# Patient Record
Sex: MALE | Race: WHITE | Employment: PART TIME | URBAN - NONMETROPOLITAN AREA
[De-identification: names, ages, dates, MRNs, and addresses within clinical notes are randomized per-mention and may not be internally consistent; named-entity substitution may affect disease eponyms.]

---
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
## 2019-05-31 ENCOUNTER — OFFICE VISIT (OUTPATIENT)
Dept: FAMILY MEDICINE CLINIC | Age: 19
End: 2019-05-31
Payer: COMMERCIAL

## 2019-05-31 VITALS
OXYGEN SATURATION: 100 % | SYSTOLIC BLOOD PRESSURE: 118 MMHG | BODY MASS INDEX: 26.32 KG/M2 | HEIGHT: 78 IN | TEMPERATURE: 97.8 F | WEIGHT: 227.5 LBS | DIASTOLIC BLOOD PRESSURE: 70 MMHG | HEART RATE: 56 BPM

## 2019-05-31 DIAGNOSIS — M79.644 PAIN IN FINGER OF RIGHT HAND: Primary | ICD-10-CM

## 2019-05-31 DIAGNOSIS — S60.011A CONTUSION OF RIGHT THUMB WITHOUT DAMAGE TO NAIL, INITIAL ENCOUNTER: ICD-10-CM

## 2019-05-31 PROCEDURE — 99213 OFFICE O/P EST LOW 20 MIN: CPT | Performed by: FAMILY MEDICINE

## 2019-05-31 RX ORDER — MINOCYCLINE HYDROCHLORIDE 100 MG/1
CAPSULE ORAL
Refills: 0 | COMMUNITY
Start: 2019-04-27 | End: 2019-06-04

## 2019-06-04 ENCOUNTER — OFFICE VISIT (OUTPATIENT)
Dept: FAMILY MEDICINE CLINIC | Age: 19
End: 2019-06-04
Payer: COMMERCIAL

## 2019-06-04 VITALS
HEIGHT: 78 IN | BODY MASS INDEX: 26.27 KG/M2 | SYSTOLIC BLOOD PRESSURE: 118 MMHG | TEMPERATURE: 97.3 F | HEART RATE: 72 BPM | WEIGHT: 227 LBS | OXYGEN SATURATION: 98 % | DIASTOLIC BLOOD PRESSURE: 72 MMHG

## 2019-06-04 DIAGNOSIS — J01.10 ACUTE FRONTAL SINUSITIS, RECURRENCE NOT SPECIFIED: Primary | ICD-10-CM

## 2019-06-04 PROBLEM — G57.31: Status: ACTIVE | Noted: 2018-08-31

## 2019-06-04 PROCEDURE — 99213 OFFICE O/P EST LOW 20 MIN: CPT | Performed by: NURSE PRACTITIONER

## 2019-06-04 RX ORDER — FLUTICASONE PROPIONATE 50 MCG
2 SPRAY, SUSPENSION (ML) NASAL DAILY
Qty: 1 BOTTLE | Refills: 0 | Status: SHIPPED
Start: 2019-06-04 | End: 2020-06-08

## 2019-06-04 RX ORDER — MONTELUKAST SODIUM 10 MG/1
10 TABLET ORAL NIGHTLY
COMMUNITY
End: 2020-06-08

## 2019-06-04 RX ORDER — AMOXICILLIN AND CLAVULANATE POTASSIUM 875; 125 MG/1; MG/1
1 TABLET, FILM COATED ORAL 2 TIMES DAILY
Qty: 20 TABLET | Refills: 0 | Status: SHIPPED | OUTPATIENT
Start: 2019-06-04 | End: 2019-06-14

## 2019-06-04 NOTE — PROGRESS NOTES
Subjective:  Chief Complaint   Patient presents with    Cough     for 3 weeks    Congestion    Sinus Problem       HPI: The patient states that they have had sinus pressure and congestion for the last 21 days. The patient does admit to facial pressure. Admits to cough which is productive of sputum. The patient also reports nasal congestion and sore throat. Denies pain with swallowing/difficulty swallowing. Patient has had purulent and clear rhinorrhea. Mild headache. Denies fevers chills. No dizziness, visual disturbances and or neck stiffness. No chest pain or dyspnea. No vomiting or diarrhea. The patient presents for evaluation. ROS:  Const: Positives and pertinent negatives as per HPI. All others reviewed and are negative. Current Outpatient Medications:     montelukast (SINGULAIR) 10 MG tablet, Take 10 mg by mouth nightly, Disp: , Rfl:     amoxicillin-clavulanate (AUGMENTIN) 875-125 MG per tablet, Take 1 tablet by mouth 2 times daily for 10 days, Disp: 20 tablet, Rfl: 0    fluticasone (FLONASE) 50 MCG/ACT nasal spray, 2 sprays by Each Nostril route daily, Disp: 1 Bottle, Rfl: 0  No Known Allergies    Objective:  Vitals:    06/04/19 1157   BP: 118/72   Site: Left Upper Arm   Position: Sitting   Cuff Size: Medium Adult   Pulse: 72   Temp: 97.3 °F (36.3 °C)   TempSrc: Temporal   SpO2: 98%   Weight: 227 lb (103 kg)   Height: (!) 6' 6\" (1.981 m)       Exam:  Exam:  Const: Appears healthy and well developed. No signs of acute distress present. Vitals reviewed per triage. Head/Face: Normocephalic, atraumatic. Facies is symmetric. Tenderness is noted over the frontal sinuses. Eyes: PERRL. ENMT: Tympanic membranes are pearly gray with good light reflex bilaterally. Nares have purulent rhinorhea Buccal mucosa is moist. Mild erythema in the posterior pharynx. Purulent PND is noted. Neck: Supple and symmetric. Palpation reveals no adenopathy. No meningeal signs. Trachea midline.    Resp: Lungs are clear bilaterally in all lung fields. Chest expansion is symmetrical no accessory muscle use. CV: S1 is normal. S2 is normal . No murmurs rubs or cicksExtremities: Peripheral circulation is grossly normal.  Musculo: Patient moves extremities without pain or limitation. Skin: Skin is warm and dry. Neuro: Alert and oriented x3. Speech is articulate and fluent. Psych: Mood/Affect: Patient's mood and affect is appropriate to situation. Discussed the use with over-the-counter probiotics for the patient to help decrease adverse effected related to the antibiotics. Reviewed s/s that would warrant further eval.    Assessment and Plan:  Preston Hoffmann was seen today for cough, congestion and sinus problem. Diagnoses and all orders for this visit:    Acute frontal sinusitis, recurrence not specified  -     amoxicillin-clavulanate (AUGMENTIN) 875-125 MG per tablet;  Take 1 tablet by mouth 2 times daily for 10 days  -     fluticasone (FLONASE) 50 MCG/ACT nasal spray; 2 sprays by Each Nostril route daily        Lenka Moreland, DI - CNP

## 2019-12-13 RX ORDER — BUPROPION HYDROCHLORIDE 150 MG/1
150 TABLET ORAL EVERY MORNING
COMMUNITY
End: 2019-12-13 | Stop reason: CLARIF

## 2019-12-13 RX ORDER — BUPROPION HYDROCHLORIDE 300 MG/1
300 TABLET ORAL EVERY MORNING
COMMUNITY
End: 2020-03-05 | Stop reason: ALTCHOICE

## 2020-01-29 ENCOUNTER — OFFICE VISIT (OUTPATIENT)
Dept: FAMILY MEDICINE CLINIC | Age: 20
End: 2020-01-29
Payer: COMMERCIAL

## 2020-01-29 VITALS
BODY MASS INDEX: 25.03 KG/M2 | HEART RATE: 98 BPM | SYSTOLIC BLOOD PRESSURE: 130 MMHG | TEMPERATURE: 97.9 F | WEIGHT: 212 LBS | RESPIRATION RATE: 18 BRPM | HEIGHT: 77 IN | OXYGEN SATURATION: 96 % | DIASTOLIC BLOOD PRESSURE: 76 MMHG

## 2020-01-29 LAB
INFLUENZA A ANTIBODY: NORMAL
INFLUENZA B ANTIBODY: NORMAL

## 2020-01-29 PROCEDURE — 99213 OFFICE O/P EST LOW 20 MIN: CPT | Performed by: PHYSICIAN ASSISTANT

## 2020-01-29 PROCEDURE — 94640 AIRWAY INHALATION TREATMENT: CPT | Performed by: PHYSICIAN ASSISTANT

## 2020-01-29 PROCEDURE — 87804 INFLUENZA ASSAY W/OPTIC: CPT | Performed by: PHYSICIAN ASSISTANT

## 2020-01-29 RX ORDER — IPRATROPIUM BROMIDE AND ALBUTEROL SULFATE 2.5; .5 MG/3ML; MG/3ML
1 SOLUTION RESPIRATORY (INHALATION) ONCE
Status: COMPLETED | OUTPATIENT
Start: 2020-01-29 | End: 2020-01-29

## 2020-01-29 RX ORDER — PREDNISONE 10 MG/1
TABLET ORAL
Qty: 20 TABLET | Refills: 0 | Status: SHIPPED | OUTPATIENT
Start: 2020-01-29 | End: 2020-02-06

## 2020-01-29 RX ORDER — AZITHROMYCIN 250 MG/1
250 TABLET, FILM COATED ORAL SEE ADMIN INSTRUCTIONS
Qty: 6 TABLET | Refills: 0 | Status: SHIPPED | OUTPATIENT
Start: 2020-01-29 | End: 2020-02-03

## 2020-01-29 RX ORDER — ALBUTEROL SULFATE 90 UG/1
2 AEROSOL, METERED RESPIRATORY (INHALATION) 4 TIMES DAILY PRN
Qty: 1 INHALER | Refills: 0 | Status: SHIPPED
Start: 2020-01-29 | End: 2020-03-06 | Stop reason: ALTCHOICE

## 2020-01-29 RX ADMIN — IPRATROPIUM BROMIDE AND ALBUTEROL SULFATE 1 AMPULE: 2.5; .5 SOLUTION RESPIRATORY (INHALATION) at 13:18

## 2020-01-29 NOTE — PROGRESS NOTES
2020   [address]  [facility] [address]  [address] [facility] [address]  : 2000  Age: 20 y.o. Sex: male      Subjective:  Chief Complaint   Patient presents with    Cough    Congestion       HPI: The patient states cough and congestion that started over a month ago but got much worse since Monday. Cough is productive of discolored sputum. Pt has not been taking OTC medications. Denies fever, chills. Denies chest pain or shortness of breath. No vomiting or diarrhea. Eating and drinking without difficulty. Non smoker. The patient presents for evaluation. ROS:    Constitutional: Negative for fatigue, fever and unexpected weight change. HENT: + for congestion. Negative for  ear discharge, ear pain, hearing loss, mouth sores, sneezing and sore throat. Eyes: Negative for photophobia, pain, redness and itching. Respiratory: +cough,  Negative for chest tightness, shortness of breath and wheezing. Cardiovascular: Negative for chest pain, palpitations and leg swelling. Gastrointestinal:  Negative for abdominal pain, constipation, diarrhea, nausea and vomiting. Endocrine: Negative for cold intolerance and heat intolerance. Genitourinary: Negative for difficulty urinating, dysuria, frequency, hematuria and urgency. Musculoskeletal: Negative for arthralgias, back pain, joint swelling, myalgias, neck pain and neck stiffness. Skin: Negative for color change, pallor and wound. Allergic/Immunologic: Negative for environmental allergies and food allergies. Neurological: Negative for dizziness, seizures, syncope, weakness, light-headedness and headaches. Hematological: Negative for adenopathy. Current Outpatient Medications:     predniSONE (DELTASONE) 10 MG tablet, Take 4 tablets by mouth daily for 2 days, THEN 3 tablets daily for 2 days, THEN 2 tablets daily for 2 days, THEN 1 tablet daily for 2 days. , Disp: 20 tablet, Rfl: 0    albuterol sulfate HFA 108 (90 Base) MCG/ACT inhaler, Inhale 2 puffs into the lungs 4 times daily as needed for Wheezing, Disp: 1 Inhaler, Rfl: 0    azithromycin (ZITHROMAX) 250 MG tablet, Take 1 tablet by mouth See Admin Instructions for 5 days 500mg on day 1 followed by 250mg on days 2 - 5, Disp: 6 tablet, Rfl: 0    buPROPion (WELLBUTRIN XL) 300 MG extended release tablet, Take 300 mg by mouth every morning, Disp: , Rfl:     montelukast (SINGULAIR) 10 MG tablet, Take 10 mg by mouth nightly, Disp: , Rfl:     fluticasone (FLONASE) 50 MCG/ACT nasal spray, 2 sprays by Each Nostril route daily, Disp: 1 Bottle, Rfl: 0   No Known Allergies     Objective:  Vitals:    01/29/20 1307   BP: 130/76   Pulse: 98   Resp: 18   Temp: 97.9 °F (36.6 °C)   TempSrc: Temporal   SpO2: 96%   Weight: 212 lb (96.2 kg)   Height: 6' 5\" (1.956 m)        Exam:  Const: Appears healthy and well developed. No signs of acute distress present. Vitals reviewed per triage. Head/Face: Normocephalic, atraumatic. Facies is symmetric. Eyes: PERRL. ENMT: Tympanic membranes are pearly gray with good light reflex bilaterally. Nares are patent. Buccal mucosa is moist.  No erythema in the posterior pharynx. Neck: Supple and symmetric. Palpation reveals no adenopathy. Trachea midline. Resp: Lungs with decreased air exchange and scattered wheezes throughout all lung field. No respiratory distress noted. CV: S1 is normal. S2 is normal.Pulses are equal bilaterally. Musculo: Patient moves extremities without pain or limitation. No pedal edema. Skin: Skin is warm and dry. Neuro: Alert and oriented x3. Speech is articulate and fluent. Psych: Patient's mood and affect is appropriate to situation. Ezio was seen today for cough and congestion. Diagnoses and all orders for this visit:    Cough  -     ipratropium-albuterol (DUONEB) nebulizer solution 1 ampule  -     POCT Influenza A/B    Bronchitis  -     predniSONE (DELTASONE) 10 MG tablet;  Take 4 tablets by

## 2020-03-05 ENCOUNTER — OFFICE VISIT (OUTPATIENT)
Dept: FAMILY MEDICINE CLINIC | Age: 20
End: 2020-03-05
Payer: COMMERCIAL

## 2020-03-05 ENCOUNTER — HOSPITAL ENCOUNTER (OUTPATIENT)
Age: 20
Discharge: HOME | End: 2020-03-07
Payer: COMMERCIAL

## 2020-03-05 VITALS
HEIGHT: 77 IN | RESPIRATION RATE: 22 BRPM | WEIGHT: 210 LBS | SYSTOLIC BLOOD PRESSURE: 142 MMHG | OXYGEN SATURATION: 98 % | BODY MASS INDEX: 24.79 KG/M2 | TEMPERATURE: 97.9 F | DIASTOLIC BLOOD PRESSURE: 90 MMHG | HEART RATE: 82 BPM

## 2020-03-05 LAB
ALBUMIN SERPL-MCNC: 4.9 G/DL (ref 3.5–5.2)
ALP BLD-CCNC: 109 U/L (ref 40–129)
ALT SERPL-CCNC: 21 U/L (ref 0–40)
ANION GAP SERPL CALCULATED.3IONS-SCNC: 13 MMOL/L (ref 7–16)
AST SERPL-CCNC: 19 U/L (ref 0–39)
BACTERIA: NORMAL /HPF
BASOPHILS ABSOLUTE: 0.03 E9/L (ref 0–0.2)
BASOPHILS RELATIVE PERCENT: 0.5 % (ref 0–2)
BILIRUB SERPL-MCNC: 0.5 MG/DL (ref 0–1.2)
BILIRUBIN URINE: NEGATIVE
BLOOD, URINE: NEGATIVE
BUN BLDV-MCNC: 11 MG/DL (ref 6–20)
CALCIUM SERPL-MCNC: 10.1 MG/DL (ref 8.6–10.2)
CHLORIDE BLD-SCNC: 100 MMOL/L (ref 98–107)
CLARITY: CLEAR
CO2: 27 MMOL/L (ref 22–29)
COLOR: YELLOW
CREAT SERPL-MCNC: 1.1 MG/DL (ref 0.7–1.2)
EOSINOPHILS ABSOLUTE: 0.25 E9/L (ref 0.05–0.5)
EOSINOPHILS RELATIVE PERCENT: 3.8 % (ref 0–6)
EPITHELIAL CELLS, UA: NORMAL /HPF
GFR AFRICAN AMERICAN: >60
GFR NON-AFRICAN AMERICAN: >60 ML/MIN/1.73
GLUCOSE BLD-MCNC: 92 MG/DL (ref 74–99)
GLUCOSE URINE: NEGATIVE MG/DL
HCT VFR BLD CALC: 48.3 % (ref 37–54)
HEMOGLOBIN: 16 G/DL (ref 12.5–16.5)
IMMATURE GRANULOCYTES #: 0.01 E9/L
IMMATURE GRANULOCYTES %: 0.2 % (ref 0–5)
KETONES, URINE: NEGATIVE MG/DL
LEUKOCYTE ESTERASE, URINE: ABNORMAL
LYMPHOCYTES ABSOLUTE: 2.33 E9/L (ref 1.5–4)
LYMPHOCYTES RELATIVE PERCENT: 35 % (ref 20–42)
MCH RBC QN AUTO: 29.5 PG (ref 26–35)
MCHC RBC AUTO-ENTMCNC: 33.1 % (ref 32–34.5)
MCV RBC AUTO: 89.1 FL (ref 80–99.9)
MONOCYTES ABSOLUTE: 0.58 E9/L (ref 0.1–0.95)
MONOCYTES RELATIVE PERCENT: 8.7 % (ref 2–12)
NEUTROPHILS ABSOLUTE: 3.46 E9/L (ref 1.8–7.3)
NEUTROPHILS RELATIVE PERCENT: 51.8 % (ref 43–80)
NITRITE, URINE: NEGATIVE
PDW BLD-RTO: 12.3 FL (ref 11.5–15)
PH UA: 7 (ref 5–9)
PLATELET # BLD: 249 E9/L (ref 130–450)
PMV BLD AUTO: 9.5 FL (ref 7–12)
POTASSIUM SERPL-SCNC: 4.4 MMOL/L (ref 3.5–5)
PROTEIN UA: NEGATIVE MG/DL
RBC # BLD: 5.42 E12/L (ref 3.8–5.8)
RBC UA: NORMAL /HPF (ref 0–2)
SODIUM BLD-SCNC: 140 MMOL/L (ref 132–146)
SPECIFIC GRAVITY UA: 1.02 (ref 1–1.03)
TOTAL PROTEIN: 8.2 G/DL (ref 6.4–8.3)
TSH SERPL DL<=0.05 MIU/L-ACNC: 1.08 UIU/ML (ref 0.27–4.2)
UROBILINOGEN, URINE: 0.2 E.U./DL
WBC # BLD: 6.7 E9/L (ref 4.5–11.5)
WBC UA: NORMAL /HPF (ref 0–5)

## 2020-03-05 PROCEDURE — 85025 COMPLETE CBC W/AUTO DIFF WBC: CPT

## 2020-03-05 PROCEDURE — 99214 OFFICE O/P EST MOD 30 MIN: CPT | Performed by: NURSE PRACTITIONER

## 2020-03-05 PROCEDURE — 81001 URINALYSIS AUTO W/SCOPE: CPT

## 2020-03-05 PROCEDURE — 84443 ASSAY THYROID STIM HORMONE: CPT

## 2020-03-05 PROCEDURE — 80053 COMPREHEN METABOLIC PANEL: CPT

## 2020-03-05 PROCEDURE — 93000 ELECTROCARDIOGRAM COMPLETE: CPT | Performed by: NURSE PRACTITIONER

## 2020-03-05 PROCEDURE — 36415 COLL VENOUS BLD VENIPUNCTURE: CPT

## 2020-03-05 NOTE — PROGRESS NOTES
inhaler, Inhale 2 puffs into the lungs 4 times daily as needed for Wheezing, Disp: 1 Inhaler, Rfl: 0    buPROPion (WELLBUTRIN XL) 300 MG extended release tablet, Take 300 mg by mouth every morning, Disp: , Rfl:     montelukast (SINGULAIR) 10 MG tablet, Take 10 mg by mouth nightly, Disp: , Rfl:     fluticasone (FLONASE) 50 MCG/ACT nasal spray, 2 sprays by Each Nostril route daily, Disp: 1 Bottle, Rfl: 0   No Known Allergies   Past Medical History:   Diagnosis Date    ADHD (attention deficit hyperactivity disorder) 03/15/2019    trail wellbutrin    GERD (gastroesophageal reflux disease) 07/31/2018    trail omeprazole    Mononucleosis 03/2017    significant elevation of LFTs    Right hip tendonitis 01/09/2017    Right knee pain 07/31/2018    current eval by ortho    Seasonal allergies     Viral illness 08/2014    with rash, fever, elevation of LFTs       Objective:  Vitals:    03/05/20 1423 03/05/20 1431 03/05/20 1441   BP: (!) 178/98 (!) 152/100 (!) 142/90   Pulse: 82     Resp: 22     Temp: 97.9 °F (36.6 °C)     TempSrc: Temporal     SpO2: 98%     Weight: 210 lb (95.3 kg)     Height: 6' 5\" (1.956 m)          Exam:  Const: Appears healthy and well developed. No signs of acute distress present. Vitals reviewed per triage. Head/Face: Normocephalic, atraumatic. Facies is symmetric. Eyes: Pupils are equal, round, and reactive to light. No gross abnormalities of fundoscopic exam.  ENMT: Tympanic membranes are pearly gray with good light reflex bilaterally. Nares are patent. Buccal mucosa was moist.   Posterior pharynx shows no exudate, irritation or redness. Neck: Supple and symmetric. Palpation reveals no adenopathy. Trachea midline. Resp: Lungs are clear bilaterally. CV: Rhythm is regular. S1 is normal. S2 is normal. No murmurs rubs or clicks. Musculo: Patient moves extremities without pain or limitation. Muscle strength is strong and equal bilaterally. Pulses are equal bilaterally.   No

## 2020-03-06 ENCOUNTER — TELEPHONE (OUTPATIENT)
Dept: PRIMARY CARE CLINIC | Age: 20
End: 2020-03-06

## 2020-03-06 RX ORDER — PROPRANOLOL HYDROCHLORIDE 20 MG/1
20 TABLET ORAL 2 TIMES DAILY PRN
Qty: 60 TABLET | Refills: 0 | Status: SHIPPED
Start: 2020-03-06 | End: 2020-08-12

## 2020-03-06 NOTE — TELEPHONE ENCOUNTER
Informed patient of script Propranolol. Asked he update us in a couple of weeks as to how he is doing. Patient agreeable.         Electronically signed by Omar Jean LPN on 3/5/84 at 53:83 PM

## 2020-03-06 NOTE — TELEPHONE ENCOUNTER
Patient called in he had to leave work due to panic attack. He now wants something to help him. Please advise.       Electronically signed by Pranav Neal LPN on 4/6/47 at 12:85 AM

## 2020-04-13 ENCOUNTER — VIRTUAL VISIT (OUTPATIENT)
Dept: PRIMARY CARE CLINIC | Age: 20
End: 2020-04-13
Payer: COMMERCIAL

## 2020-04-13 PROBLEM — F41.0 PANIC ATTACK: Status: ACTIVE | Noted: 2020-04-13

## 2020-04-13 PROCEDURE — G2012 BRIEF CHECK IN BY MD/QHP: HCPCS | Performed by: INTERNAL MEDICINE

## 2020-04-13 ASSESSMENT — PATIENT HEALTH QUESTIONNAIRE - PHQ9
SUM OF ALL RESPONSES TO PHQ QUESTIONS 1-9: 0
SUM OF ALL RESPONSES TO PHQ9 QUESTIONS 1 & 2: 0
1. LITTLE INTEREST OR PLEASURE IN DOING THINGS: 0
SUM OF ALL RESPONSES TO PHQ QUESTIONS 1-9: 0
2. FEELING DOWN, DEPRESSED OR HOPELESS: 0

## 2020-04-13 NOTE — PROGRESS NOTES
OMEPRAZOLE  Add - (3/15/2019) TRIAL WELLBUTRIN  Panic disorder- 4/13/2020  Surgical Hx:  No Past History of Procedure  Reviewed and updated. FH:  Father:  HT/Lipids. Mother:  Allergies/Asthma. Reviewed, no changes. SH:  Marital: Single. Personal Habits: Cigarette Use: Never Smoked Cigarettes. Alcohol: Denies alcohol use. Reviewed, no changes. Nati Mcmahon was seen today for other. Diagnoses and all orders for this visit:    Panic attack    Jimenez Art is a 20 y.o. male evaluated via telephone on 4/13/2020. Consent:  He and/or health care decision maker is aware that that he may receive a bill for this telephone service, depending on his insurance coverage, and has provided verbal consent to proceed: Yes          I affirm this is a Patient Initiated Episode with an Established Patient who has not had a related appointment within my department in the past 7 days or scheduled within the next 24 hours. Total Time: minutes: 5-10 minutes    Note: not billable if this call serves to triage the patient into an appointment for the relevant concern    The patient is advised to call me if symptoms worsen. If they do then he would prefer to try counseling before trying medication. The patient has no suicidal or homicidal ideation and he has improved using conservative measures including increased exercise and changing his work environment.   Dominique Castellanos

## 2020-06-08 ENCOUNTER — HOSPITAL ENCOUNTER (OUTPATIENT)
Age: 20
Discharge: HOME | End: 2020-06-10
Payer: COMMERCIAL

## 2020-06-08 ENCOUNTER — VIRTUAL VISIT (OUTPATIENT)
Dept: PRIMARY CARE CLINIC | Age: 20
End: 2020-06-08
Payer: COMMERCIAL

## 2020-06-08 PROCEDURE — 86696 HERPES SIMPLEX TYPE 2 TEST: CPT

## 2020-06-08 PROCEDURE — 86694 HERPES SIMPLEX NES ANTBDY: CPT

## 2020-06-08 PROCEDURE — 87591 N.GONORRHOEAE DNA AMP PROB: CPT

## 2020-06-08 PROCEDURE — 96372 THER/PROPH/DIAG INJ SC/IM: CPT | Performed by: INTERNAL MEDICINE

## 2020-06-08 PROCEDURE — 86695 HERPES SIMPLEX TYPE 1 TEST: CPT

## 2020-06-08 PROCEDURE — 87491 CHLMYD TRACH DNA AMP PROBE: CPT

## 2020-06-08 PROCEDURE — 86703 HIV-1/HIV-2 1 RESULT ANTBDY: CPT

## 2020-06-08 PROCEDURE — 99214 OFFICE O/P EST MOD 30 MIN: CPT | Performed by: INTERNAL MEDICINE

## 2020-06-08 PROCEDURE — 36415 COLL VENOUS BLD VENIPUNCTURE: CPT

## 2020-06-08 RX ORDER — CEFTRIAXONE SODIUM 250 MG/1
250 INJECTION, POWDER, FOR SOLUTION INTRAMUSCULAR; INTRAVENOUS ONCE
Status: COMPLETED | OUTPATIENT
Start: 2020-06-08 | End: 2020-06-08

## 2020-06-08 RX ORDER — DOXYCYCLINE HYCLATE 100 MG
100 TABLET ORAL 2 TIMES DAILY
Qty: 20 TABLET | Refills: 0 | Status: SHIPPED | OUTPATIENT
Start: 2020-06-08 | End: 2020-06-18

## 2020-06-08 RX ADMIN — CEFTRIAXONE SODIUM 250 MG: 250 INJECTION, POWDER, FOR SOLUTION INTRAMUSCULAR; INTRAVENOUS at 09:43

## 2020-06-08 NOTE — PROGRESS NOTES
within normal range. Eyes: EOMI in both eyes. PERRL. ENMT: External ears WNL. Tympanic membranes are intact. External nose WNL. Moistness and  normal color of the nasal mucosae. Septum is in the midline. Dentition is in good repair. Gums  appear healthy. Posterior pharynx shows no exudate, irritation or redness. Neck: Supple and symmetric. Palpation reveals no adenopathy. No masses appreciated. Thyroid:  no nodule appreciated or thyromegaly. No jugular venous distention. Resp: Respirations are unlabored. Respiration rate is normal. Auscultate good airflow. No rales,  rhonchi or wheezes appreciated over the lungs bilaterally. CV: Rhythm is regular. S1 is normal. S2 is normal. Carotids: no bruits. Abdominal aorta: not  palpable. Pedal pulses: 2+ and equal bilaterally. Extremities: No clubbing, cyanosis or edema. Abdomen: Bowel sounds are normoactive. Palpation reveals softness, with no distension,  organomegaly or tenderness. No abdominal masses palpable. No palpable hepatosplenomegaly. Right and slightly tender inguinal lymph nodes on the right side. No abnormalities of testicle size. No indirect or direct hernia. No penile discharge. Musculo: Walks with a normal gait. Upper Extremities: ROM: Full ROM bilaterally. Lower  Extremities: ROM: Full ROM bilaterally. Skin: Dry and warm with no rash. Neuro: Alert and oriented x3. Mood is normal. Affect is normal. Speech is articulate and fluent. DTR's are symmetric, intact and 2+ bilaterally. Ezio was seen today for mass. Diagnoses and all orders for this visit:    High risk heterosexual behavior  -     C.TRACHOMATIS Scarlet Sin; Future  -     HIV RAPID 1&2; Future  -     HERPES SIMPLEX VIRUS (HSV) I/II ANTIBODIES IGG & IGM W/ REFLEX; Future    Other orders  -     doxycycline hyclate (VIBRA-TABS) 100 MG tablet;  Take 1 tablet by mouth 2 times daily for 10 days  -     cefTRIAXone (ROCEPHIN) injection 250 mg    I would regard this is high risk heterosexual behavior. Unprotected with sexual intercourse multiple times. The patient will be worked up for possible sexually transmitted diseases. Patient is given Rocephin 250 mg IM along with doxycycline 100 mg twice daily. This will be given for 10 days. The patient is counseled regarding risky sexual behavior.

## 2020-06-09 LAB — HIV-1 AND HIV-2 ANTIBODIES: NORMAL

## 2020-06-11 LAB
C. TRACHOMATIS DNA ,URINE: NEGATIVE
N. GONORRHOEAE DNA, URINE: NEGATIVE
SOURCE: NORMAL

## 2020-06-12 LAB
HERPES TYPE 1/2 IGM COMBINED: 0.9 IV
HERPES TYPE I/II IGG COMBINED: >22.4 IV
HSV 1 GLYCOPROTEIN G AB IGG: 32.2 IV
HSV 2 GLYCOPROTEIN G AB IGG: 0.28 IV

## 2020-08-12 RX ORDER — PROPRANOLOL HYDROCHLORIDE 20 MG/1
TABLET ORAL
Qty: 60 TABLET | Refills: 0 | Status: SHIPPED
Start: 2020-08-12 | End: 2021-02-16

## 2020-10-16 ENCOUNTER — OFFICE VISIT (OUTPATIENT)
Dept: FAMILY MEDICINE CLINIC | Age: 20
End: 2020-10-16
Payer: COMMERCIAL

## 2020-10-16 VITALS
HEART RATE: 84 BPM | HEIGHT: 77 IN | TEMPERATURE: 97.5 F | WEIGHT: 218 LBS | DIASTOLIC BLOOD PRESSURE: 80 MMHG | SYSTOLIC BLOOD PRESSURE: 122 MMHG | BODY MASS INDEX: 25.74 KG/M2 | RESPIRATION RATE: 18 BRPM | OXYGEN SATURATION: 99 %

## 2020-10-16 PROCEDURE — 99213 OFFICE O/P EST LOW 20 MIN: CPT | Performed by: PHYSICIAN ASSISTANT

## 2020-10-16 RX ORDER — PREDNISONE 10 MG/1
TABLET ORAL
Qty: 20 TABLET | Refills: 0 | Status: SHIPPED | OUTPATIENT
Start: 2020-10-16 | End: 2020-10-24

## 2020-10-16 RX ORDER — TRIAMCINOLONE ACETONIDE 0.25 MG/G
CREAM TOPICAL
Qty: 1 TUBE | Refills: 0 | Status: SHIPPED
Start: 2020-10-16 | End: 2021-02-16

## 2020-11-05 ENCOUNTER — NURSE ONLY (OUTPATIENT)
Dept: PRIMARY CARE CLINIC | Age: 20
End: 2020-11-05

## 2020-11-05 DIAGNOSIS — Z20.822 EXPOSURE TO COVID-19 VIRUS: ICD-10-CM

## 2020-11-07 LAB
SARS-COV-2: DETECTED
SOURCE: ABNORMAL

## 2021-02-16 ENCOUNTER — OFFICE VISIT (OUTPATIENT)
Dept: FAMILY MEDICINE CLINIC | Age: 21
End: 2021-02-16
Payer: COMMERCIAL

## 2021-02-16 VITALS
WEIGHT: 222 LBS | HEART RATE: 96 BPM | SYSTOLIC BLOOD PRESSURE: 132 MMHG | DIASTOLIC BLOOD PRESSURE: 90 MMHG | OXYGEN SATURATION: 98 % | TEMPERATURE: 97.2 F | BODY MASS INDEX: 26.33 KG/M2

## 2021-02-16 DIAGNOSIS — R05.8 PRODUCTIVE COUGH: ICD-10-CM

## 2021-02-16 DIAGNOSIS — J02.9 ACUTE PHARYNGITIS, UNSPECIFIED ETIOLOGY: ICD-10-CM

## 2021-02-16 DIAGNOSIS — J34.89 TENDERNESS OVER MAXILLARY SINUS: ICD-10-CM

## 2021-02-16 DIAGNOSIS — R06.2 WHEEZING: ICD-10-CM

## 2021-02-16 DIAGNOSIS — J01.00 ACUTE NON-RECURRENT MAXILLARY SINUSITIS: Primary | ICD-10-CM

## 2021-02-16 DIAGNOSIS — J20.9 ACUTE BRONCHITIS, UNSPECIFIED ORGANISM: ICD-10-CM

## 2021-02-16 PROCEDURE — 99213 OFFICE O/P EST LOW 20 MIN: CPT | Performed by: NURSE PRACTITIONER

## 2021-02-16 RX ORDER — DOXYCYCLINE HYCLATE 100 MG
100 TABLET ORAL 2 TIMES DAILY
Qty: 20 TABLET | Refills: 0 | Status: SHIPPED
Start: 2021-02-16 | End: 2021-02-26

## 2021-02-16 NOTE — PROGRESS NOTES
Chief Complaint:   Sinus Problem (last week ) and Chest Congestion      History of Present Illness   Source of history provided by:  patient. Melvina Gomez is a 23 y.o. old male who presents to express care today for nasal congestion, sore throat, facial pain, cough (with green mucus production) and wheezing, which began 7 days prior to arrival. The symptoms are associated with fatigue. There has been NO appetite decrease, chest pain, dizziness, neck stiffness, rash or swollen glands. Denies any hx of COPD. Does have exercise induced asthma and chews tobacco, but does not smoke cigarettes. Review of Systems   Unless otherwise stated in this report or unable to obtain because of the patient's clinical or mental status as evidenced by the medical record, this patients's positive and negative responses for Review of Systems, constitutional, psych, eyes, ENT, cardiovascular, respiratory, gastrointestinal, neurological, genitourinary, musculoskeletal, integument systems and systems related to the presenting problem are either stated in the preceding or were not pertinent or were negative for the symptoms and/or complaints related to the medical problem. Past Medical History:  has a past medical history of ADHD (attention deficit hyperactivity disorder), GERD (gastroesophageal reflux disease), Mononucleosis, Right hip tendonitis, Right knee pain, Seasonal allergies, and Viral illness. Past Surgical History:  has no past surgical history on file. Social History:  reports that he has never smoked. His smokeless tobacco use includes chew. He reports that he does not use drugs. Family History: family history includes Allergies in his mother; Asthma in his mother; High Blood Pressure in his father. Allergies: Patient has no known allergies.     Physical Exam   Vital Signs:  BP (!) 132/90   Pulse 96   Temp 97.2 °F (36.2 °C) (Temporal)   Wt 222 lb (100.7 kg)   SpO2 98%   BMI 26.33 kg/m²    Oxygen Saturation Interpretation: Normal.    Constitutional:  Alert, development consistent with age. Ears: Bilateral pinna normal. TMs clear without erythema or perforation bilaterally. Canals normal bilaterally without swelling or exudate  Nose:   Bilateral congestion of the nasal mucosa. There is no injection to middle turbinates bilaterally. Throat: + posterior pharyngeal erythema with mild post nasal drip present. No exudate or tonsillar hypertrophy noted. Neck:  Supple. There is no anterior cervical adenopathy. Lungs: CTAB without rales, or rhonchi. Slight wheezing noted to posterior lungs  Heart:  Regular rate and rhythm, normal heart sounds, without pathological murmurs, ectopy, gallops, or rubs. Skin:  Normal turgor. Warm, dry, without visible rash. Neurological:  Alert and oriented. Motor functions intact. Responds to verbal commands. Test Results Section   (All laboratory and radiology results have been personally reviewed by myself)  Labs:  No results found for this visit on 02/16/21. Imaging:  No results found. Assessment / Plan     Impression(s):  Tasha Romero was seen today for sinus problem and chest congestion. Diagnoses and all orders for this visit:    Acute non-recurrent maxillary sinusitis  -     doxycycline hyclate (VIBRA-TABS) 100 MG tablet; Take 1 tablet by mouth 2 times daily for 10 days    Tenderness over maxillary sinus    Acute bronchitis, unspecified organism  -     doxycycline hyclate (VIBRA-TABS) 100 MG tablet; Take 1 tablet by mouth 2 times daily for 10 days    Productive cough    Wheezing    Acute pharyngitis, unspecified etiology        Script written for doxycycline, side effects discussed. Additional symptomatic relief discussed. PCP in 5-7 days if symptoms persist. ED sooner if symptoms worsen or change. Red flag symptoms discussed. Pt is in agreement with this care plan. All questions answered. Return if symptoms worsen or fail to improve.     DI Brannon - NP

## 2021-02-16 NOTE — LETTER
[facility]  [address] [place] [address]  Phone: [phone]  Fax: [phone]    DI De Guzman NP        February 16, 2021     Patient: Pina Gaffney   YOB: 2000   Date of Visit: 2/16/2021       To Whom It May Concern: It is my medical opinion that Pina Gaffney may return to work on 02/18/2021. He was seen in the walk-in clinic today. If you have any questions or concerns, please don't hesitate to call.     Sincerely,        DI De Guzman NP

## 2021-02-19 ENCOUNTER — TELEPHONE (OUTPATIENT)
Dept: FAMILY MEDICINE CLINIC | Age: 21
End: 2021-02-19

## 2021-02-19 RX ORDER — CEFUROXIME AXETIL 500 MG/1
500 TABLET ORAL 2 TIMES DAILY
Qty: 14 TABLET | Refills: 0 | Status: SHIPPED
Start: 2021-02-19 | End: 2021-02-26

## 2021-02-19 NOTE — TELEPHONE ENCOUNTER
Mother, Zoe Duverney called to say that pt was recently prescribed Doxycycline and it is making him vomit. She is asking if you would call something else in for him. He was seen in express on 2/16/21.

## 2021-02-26 ENCOUNTER — OFFICE VISIT (OUTPATIENT)
Dept: PRIMARY CARE CLINIC | Age: 21
End: 2021-02-26
Payer: COMMERCIAL

## 2021-02-26 VITALS
TEMPERATURE: 96.9 F | OXYGEN SATURATION: 98 % | DIASTOLIC BLOOD PRESSURE: 82 MMHG | SYSTOLIC BLOOD PRESSURE: 140 MMHG | HEIGHT: 77 IN | RESPIRATION RATE: 18 BRPM | WEIGHT: 217 LBS | BODY MASS INDEX: 25.62 KG/M2 | HEART RATE: 78 BPM

## 2021-02-26 DIAGNOSIS — R03.0 ELEVATED BLOOD PRESSURE READING: ICD-10-CM

## 2021-02-26 DIAGNOSIS — F41.9 ANXIETY: Primary | ICD-10-CM

## 2021-02-26 LAB
ALBUMIN SERPL-MCNC: 4.9 G/DL (ref 3.5–5.2)
ALP BLD-CCNC: 70 U/L (ref 40–129)
ALT SERPL-CCNC: 16 U/L (ref 0–40)
ANION GAP SERPL CALCULATED.3IONS-SCNC: 9 MMOL/L (ref 7–16)
AST SERPL-CCNC: 26 U/L (ref 0–39)
BACTERIA: ABNORMAL /HPF
BASOPHILS ABSOLUTE: 0.06 E9/L (ref 0–0.2)
BASOPHILS RELATIVE PERCENT: 1 % (ref 0–2)
BILIRUB SERPL-MCNC: 1 MG/DL (ref 0–1.2)
BILIRUBIN URINE: NEGATIVE
BLOOD, URINE: NEGATIVE
BUN BLDV-MCNC: 12 MG/DL (ref 6–20)
CALCIUM SERPL-MCNC: 9.8 MG/DL (ref 8.6–10.2)
CHLORIDE BLD-SCNC: 106 MMOL/L (ref 98–107)
CLARITY: NORMAL
CO2: 26 MMOL/L (ref 22–29)
COLOR: YELLOW
CREAT SERPL-MCNC: 1.3 MG/DL (ref 0.7–1.2)
EOSINOPHILS ABSOLUTE: 0.35 E9/L (ref 0.05–0.5)
EOSINOPHILS RELATIVE PERCENT: 6.1 % (ref 0–6)
GFR AFRICAN AMERICAN: >60
GFR NON-AFRICAN AMERICAN: >60 ML/MIN/1.73
GLUCOSE BLD-MCNC: 90 MG/DL (ref 74–99)
GLUCOSE URINE: NEGATIVE MG/DL
HCT VFR BLD CALC: 47.9 % (ref 37–54)
HEMOGLOBIN: 15.5 G/DL (ref 12.5–16.5)
IMMATURE GRANULOCYTES #: 0.01 E9/L
IMMATURE GRANULOCYTES %: 0.2 % (ref 0–5)
KETONES, URINE: NEGATIVE MG/DL
LEUKOCYTE ESTERASE, URINE: NEGATIVE
LYMPHOCYTES ABSOLUTE: 2.44 E9/L (ref 1.5–4)
LYMPHOCYTES RELATIVE PERCENT: 42.4 % (ref 20–42)
MCH RBC QN AUTO: 29.3 PG (ref 26–35)
MCHC RBC AUTO-ENTMCNC: 32.4 % (ref 32–34.5)
MCV RBC AUTO: 90.5 FL (ref 80–99.9)
MONOCYTES ABSOLUTE: 0.57 E9/L (ref 0.1–0.95)
MONOCYTES RELATIVE PERCENT: 9.9 % (ref 2–12)
NEUTROPHILS ABSOLUTE: 2.33 E9/L (ref 1.8–7.3)
NEUTROPHILS RELATIVE PERCENT: 40.4 % (ref 43–80)
NITRITE, URINE: NEGATIVE
PDW BLD-RTO: 12.6 FL (ref 11.5–15)
PH UA: 6 (ref 5–9)
PLATELET # BLD: 295 E9/L (ref 130–450)
PMV BLD AUTO: 9.5 FL (ref 7–12)
POTASSIUM SERPL-SCNC: 4.8 MMOL/L (ref 3.5–5)
PROTEIN UA: NEGATIVE MG/DL
RBC # BLD: 5.29 E12/L (ref 3.8–5.8)
RBC UA: ABNORMAL /HPF (ref 0–2)
SODIUM BLD-SCNC: 141 MMOL/L (ref 132–146)
SPECIFIC GRAVITY UA: 1.02 (ref 1–1.03)
TOTAL PROTEIN: 8 G/DL (ref 6.4–8.3)
TSH SERPL DL<=0.05 MIU/L-ACNC: 1.1 UIU/ML (ref 0.27–4.2)
UROBILINOGEN, URINE: 0.2 E.U./DL
WBC # BLD: 5.8 E9/L (ref 4.5–11.5)
WBC UA: ABNORMAL /HPF (ref 0–5)

## 2021-02-26 PROCEDURE — 99213 OFFICE O/P EST LOW 20 MIN: CPT | Performed by: NURSE PRACTITIONER

## 2021-02-26 RX ORDER — FLUOXETINE 10 MG/1
10 CAPSULE ORAL DAILY
Qty: 30 CAPSULE | Refills: 3 | Status: SHIPPED
Start: 2021-02-26 | End: 2021-07-14 | Stop reason: SDUPTHER

## 2021-02-26 SDOH — ECONOMIC STABILITY: FOOD INSECURITY: WITHIN THE PAST 12 MONTHS, YOU WORRIED THAT YOUR FOOD WOULD RUN OUT BEFORE YOU GOT MONEY TO BUY MORE.: NEVER TRUE

## 2021-02-26 SDOH — ECONOMIC STABILITY: INCOME INSECURITY: HOW HARD IS IT FOR YOU TO PAY FOR THE VERY BASICS LIKE FOOD, HOUSING, MEDICAL CARE, AND HEATING?: NOT HARD AT ALL

## 2021-02-26 SDOH — ECONOMIC STABILITY: TRANSPORTATION INSECURITY
IN THE PAST 12 MONTHS, HAS THE LACK OF TRANSPORTATION KEPT YOU FROM MEDICAL APPOINTMENTS OR FROM GETTING MEDICATIONS?: NOT ASKED

## 2021-02-26 ASSESSMENT — PATIENT HEALTH QUESTIONNAIRE - PHQ9
SUM OF ALL RESPONSES TO PHQ QUESTIONS 1-9: 1
SUM OF ALL RESPONSES TO PHQ9 QUESTIONS 1 & 2: 1

## 2021-02-26 NOTE — PROGRESS NOTES
Subjective:  Chief Complaint   Patient presents with    Anxiety    Depression    Hypertension       HPI: Patient presents today with anxiety. He was seen about 1 year ago with similar complaints, he was started on Inderal which at the time he thought was working well, but in hindsight at this point he does not feel was helpful. The patient continued to deal with anxiety off and on over the last year. He states he always has a sensation of feeling behind, but he knows he is doing well with a good pain and stable job. Patient denies any suicidal or homicidal ideations, he states he does not feel he is depressed. He states he has approximately 1 panic attack per week. He did see a counselor several years ago, but states he only went to a few sessions before deciding he did not mesh well with a counselor and discontinued this. He is not opposed to going back to counseling, but his work schedule makes it difficult as he is working 5 to 10-hour shifts out of town. He states this will go to 4 days/week in the future and at that point he would like to consider counseling. He discusses a family history of anxiety and depression in both his mom and dad. He states his dad has shared with him that Prozac has been significantly helpful and the patient himself would like to try a medication for this. Blood pressure is more elevated than previous. The patient is quite anxious appearing, and I suspect elevated blood pressure is most likely related to anxiety. He does smoke marijuana about 3 times per week. ROS:  Positive and pertinent negatives as per HPI. All other systems are reviewed and negative.        Current Outpatient Medications:     FLUoxetine (PROZAC) 10 MG capsule, Take 1 capsule by mouth daily, Disp: 30 capsule, Rfl: 3   No Known Allergies   Past Medical History:   Diagnosis Date    ADHD (attention deficit hyperactivity disorder) 03/15/2019    trail wellbutrin  GERD (gastroesophageal reflux disease) 07/31/2018    trail omeprazole    Mononucleosis 03/2017    significant elevation of LFTs    Right hip tendonitis 01/09/2017    Right knee pain 07/31/2018    current eval by ortho    Seasonal allergies     Viral illness 08/2014    with rash, fever, elevation of LFTs       Objective:  Vitals:    02/26/21 0852 02/26/21 0933   BP: (!) 142/92 (!) 140/82   Site: Left Upper Arm    Position: Sitting    Cuff Size: Medium Adult    Pulse: 78    Resp: 18    Temp: 96.9 °F (36.1 °C)    TempSrc: Temporal    SpO2: 98%    Weight: 217 lb (98.4 kg)    Height: 6' 5\" (1.956 m)         Exam:  Const: Appears healthy and well developed. No signs of acute distress present. Vitals reviewed per triage. Head/Face: Normocephalic, atraumatic. Facies is symmetric. Eyes: Pupils are equal, round, and reactive to light. ENMT: Tympanic membranes are pearly gray with good light reflex bilaterally. Nares are patent. Buccal mucosa was moist.   Posterior pharynx shows no exudate, irritation or redness. Neck: Supple and symmetric. Palpation reveals no adenopathy. Trachea midline. Resp: Lungs are clear bilaterally. CV: Rhythm is regular. S1 is normal. S2 is normal. No murmurs rubs or clicks. Abdomen: Bowel sounds present x4 quadrants. Abdomen soft, round, nontender. Musculo: Patient moves extremities without pain or limitation. Muscle strength is strong and equal bilaterally. Pulses are equal bilaterally. No edema. Skin: Skin is warm and dry. Neuro: Alert and oriented x3. Speech is articulate and fluent. No focal neurologic deficits. Psych: Appears anxious. Nigel Roberts was seen today for anxiety, depression and hypertension. Diagnoses and all orders for this visit:    Anxiety  -     FLUoxetine (PROZAC) 10 MG capsule; Take 1 capsule by mouth daily    Elevated blood pressure reading  -     CBC Auto Differential; Future  -     Comprehensive Metabolic Panel;  Future -     TSH without Reflex; Future  -     Urinalysis; Future       Repeat blood pressure is better but still more elevated than goal. We discussed medications at length. The patient will be started on Prozac, we will plan to see him back in about 4 weeks. Lab work will be done today to further evaluate anxiety as well as elevated blood pressures. If this remains elevated at his next office visit, would consider EKG but he did have one 1 year ago which was normal. The patient will let us know if he has any other issues.     Seen By:    DI Duke - CNP

## 2021-03-01 DIAGNOSIS — N28.9 ABNORMAL KIDNEY FUNCTION: Primary | ICD-10-CM

## 2021-03-01 DIAGNOSIS — Z20.2 EXPOSURE TO SEXUALLY TRANSMITTED DISEASE (STD): Primary | ICD-10-CM

## 2021-04-22 ENCOUNTER — OFFICE VISIT (OUTPATIENT)
Dept: FAMILY MEDICINE CLINIC | Age: 21
End: 2021-04-22
Payer: COMMERCIAL

## 2021-04-22 VITALS
SYSTOLIC BLOOD PRESSURE: 150 MMHG | BODY MASS INDEX: 25.61 KG/M2 | HEART RATE: 70 BPM | OXYGEN SATURATION: 98 % | DIASTOLIC BLOOD PRESSURE: 88 MMHG | WEIGHT: 216 LBS | TEMPERATURE: 97.8 F

## 2021-04-22 DIAGNOSIS — R19.7 DIARRHEA, UNSPECIFIED TYPE: ICD-10-CM

## 2021-04-22 DIAGNOSIS — R19.7 DIARRHEA, UNSPECIFIED TYPE: Primary | ICD-10-CM

## 2021-04-22 LAB
ANION GAP SERPL CALCULATED.3IONS-SCNC: 13 MMOL/L (ref 7–16)
BASOPHILS ABSOLUTE: 0.05 E9/L (ref 0–0.2)
BASOPHILS RELATIVE PERCENT: 0.7 % (ref 0–2)
BUN BLDV-MCNC: 20 MG/DL (ref 6–20)
CALCIUM SERPL-MCNC: 10.6 MG/DL (ref 8.6–10.2)
CHLORIDE BLD-SCNC: 103 MMOL/L (ref 98–107)
CO2: 25 MMOL/L (ref 22–29)
CREAT SERPL-MCNC: 1.3 MG/DL (ref 0.7–1.2)
EOSINOPHILS ABSOLUTE: 0.18 E9/L (ref 0.05–0.5)
EOSINOPHILS RELATIVE PERCENT: 2.4 % (ref 0–6)
GFR AFRICAN AMERICAN: >60
GFR NON-AFRICAN AMERICAN: >60 ML/MIN/1.73
GLUCOSE BLD-MCNC: 76 MG/DL (ref 74–99)
HCT VFR BLD CALC: 48.9 % (ref 37–54)
HEMOGLOBIN: 16.4 G/DL (ref 12.5–16.5)
IMMATURE GRANULOCYTES #: 0.02 E9/L
IMMATURE GRANULOCYTES %: 0.3 % (ref 0–5)
LYMPHOCYTES ABSOLUTE: 1.6 E9/L (ref 1.5–4)
LYMPHOCYTES RELATIVE PERCENT: 21.6 % (ref 20–42)
MCH RBC QN AUTO: 30.5 PG (ref 26–35)
MCHC RBC AUTO-ENTMCNC: 33.5 % (ref 32–34.5)
MCV RBC AUTO: 90.9 FL (ref 80–99.9)
MONOCYTES ABSOLUTE: 0.44 E9/L (ref 0.1–0.95)
MONOCYTES RELATIVE PERCENT: 5.9 % (ref 2–12)
NEUTROPHILS ABSOLUTE: 5.11 E9/L (ref 1.8–7.3)
NEUTROPHILS RELATIVE PERCENT: 69.1 % (ref 43–80)
PDW BLD-RTO: 12.8 FL (ref 11.5–15)
PLATELET # BLD: 290 E9/L (ref 130–450)
PMV BLD AUTO: 9.3 FL (ref 7–12)
POTASSIUM SERPL-SCNC: 4.6 MMOL/L (ref 3.5–5)
RBC # BLD: 5.38 E12/L (ref 3.8–5.8)
SODIUM BLD-SCNC: 141 MMOL/L (ref 132–146)
WBC # BLD: 7.4 E9/L (ref 4.5–11.5)

## 2021-04-22 PROCEDURE — 99213 OFFICE O/P EST LOW 20 MIN: CPT | Performed by: PHYSICIAN ASSISTANT

## 2021-04-22 NOTE — PROGRESS NOTES
2021   [address]  [facility] [address]  [address] [facility] [address]  : 2000  Age: 23 y.o. Sex: male      Subjective:  Chief Complaint   Patient presents with    Abdominal Pain    Diarrhea       HPI: The patient states has had diarrhea that started a couple weeks ago. The diarrhea is described as brown, watery and unformed. The patient has had 2-3 episodes daily for last couple weeks. The patient denies any black or bloody stools. No mucous in the stool. The patient does admit to a little abdominal cramping prior to diarrhea episodes. No nausea or vomiting. No fevers or chills. The patient does have a little decrease appetite but is still taking good PO fluids. No contact exposures. No new medications. No recent antibiotic use or travel. The patient denies dysuria, urinary frequency, urgency and or gross hematuria. No flank pain. No chest pain or dyspnea. They come to the urgent care for evaluation. ROS:  Positive and pertinent negatives as per HPI. All other systems are reviewed and negative. Current Outpatient Medications:     FLUoxetine (PROZAC) 10 MG capsule, Take 1 capsule by mouth daily, Disp: 30 capsule, Rfl: 3   No Known Allergies     Objective:  Vitals:    21 1022 21 1024   BP: (!) 150/88 (!) 150/88   Pulse: 70    Temp: 97.8 °F (36.6 °C)    TempSrc: Temporal    SpO2: 98%    Weight: 216 lb (98 kg)         Exam:  Const: Appears healthy and well developed. No signs of acute distress present. Vitals reviewed per triage. Head/Face: Normocephalic, atraumatic. Facies is symmetric. Eyes: PERRL. ENMT: Tympanic membranes are pearly gray with good light reflex bilaterally. Nares are patent. Buccal mucosa was moist.   Posterior pharynx shows no exudate, irritation or redness. Neck: Supple and symmetric. Palpation reveals no adenopathy. Trachea midline. Resp: Lungs are clear bilaterally. CV: Rhythm is regular.  S1 is normal. S2 is normal. No murmurs rubs or clicks. Pulses are equal bilaterally. No edema of the lower limbs bilaterally. Abdomen: Abdomen soft, nontender to palpation. No masses or organomegaly. No rebound or guarding. Bowel sounds audible in all four quadrants. Perineum/Anus/Rectum: . (Exam Deferred)  Musculo: Walks with a normal gait. Patient moves extremities without pain or limitation. Skin: Skin is warm and dry. Neuro: Alert and oriented x3. Speech is articulate and fluent. Psych:  Patient's mood and affect is appropriate        Patricio Granado was seen today for abdominal pain and diarrhea. Diagnoses and all orders for this visit:    Diarrhea, unspecified type  -     Basic Metabolic Panel; Future  -     CBC Auto Differential; Future  -     Clostridium difficile EIA; Future  -     Fecal leukocytes; Future  -     Giardia antigen; Future  -     Miscellaneous Sendout 1; Future    Was instructed on a brat diet also instructed over-the-counter Imodium or Pepto-Bismol as needed. ER warning signs given    Return for Follow up with PCP in 5 days for re-evaluation. .    Seen By:    KING Casas

## 2021-04-23 LAB
GIARDIA ANTIGEN STOOL: NORMAL
REASON FOR REJECTION: NORMAL
REJECTED TEST: NORMAL
WHITE BLOOD CELLS (WBC), STOOL: NORMAL

## 2021-04-29 LAB
Lab: NORMAL
REPORT: NORMAL
THIS TEST SENT TO: NORMAL

## 2021-07-14 DIAGNOSIS — F41.9 ANXIETY: ICD-10-CM

## 2021-07-14 RX ORDER — FLUOXETINE 10 MG/1
10 CAPSULE ORAL DAILY
Qty: 30 CAPSULE | Refills: 3 | Status: SHIPPED
Start: 2021-07-14 | End: 2021-11-16

## 2021-11-15 DIAGNOSIS — F41.9 ANXIETY: ICD-10-CM

## 2021-11-16 RX ORDER — FLUOXETINE 10 MG/1
CAPSULE ORAL
Qty: 30 CAPSULE | Refills: 3 | Status: SHIPPED
Start: 2021-11-16 | End: 2022-04-22

## 2021-11-16 NOTE — TELEPHONE ENCOUNTER
Voicemail left for patient to return our call at their earliest convenience. Pt needs to make an appointment. Last Appointment:  6/8/2020  No future appointments.

## 2022-02-18 ENCOUNTER — OFFICE VISIT (OUTPATIENT)
Dept: FAMILY MEDICINE CLINIC | Age: 22
End: 2022-02-18
Payer: COMMERCIAL

## 2022-02-18 VITALS
OXYGEN SATURATION: 98 % | WEIGHT: 216 LBS | HEIGHT: 77 IN | SYSTOLIC BLOOD PRESSURE: 122 MMHG | BODY MASS INDEX: 25.5 KG/M2 | RESPIRATION RATE: 18 BRPM | DIASTOLIC BLOOD PRESSURE: 76 MMHG | HEART RATE: 104 BPM | TEMPERATURE: 97.4 F

## 2022-02-18 DIAGNOSIS — J06.9 VIRAL URI WITH COUGH: Primary | ICD-10-CM

## 2022-02-18 DIAGNOSIS — R05.9 COUGH: ICD-10-CM

## 2022-02-18 LAB
Lab: NORMAL
PERFORMING INSTRUMENT: NORMAL
QC PASS/FAIL: NORMAL
SARS-COV-2, POC: NORMAL

## 2022-02-18 PROCEDURE — 99213 OFFICE O/P EST LOW 20 MIN: CPT | Performed by: NURSE PRACTITIONER

## 2022-02-18 PROCEDURE — 87426 SARSCOV CORONAVIRUS AG IA: CPT | Performed by: NURSE PRACTITIONER

## 2022-02-18 RX ORDER — BROMPHENIRAMINE MALEATE, PSEUDOEPHEDRINE HYDROCHLORIDE, AND DEXTROMETHORPHAN HYDROBROMIDE 2; 30; 10 MG/5ML; MG/5ML; MG/5ML
5 SYRUP ORAL 4 TIMES DAILY PRN
Qty: 240 ML | Refills: 1 | Status: SHIPPED | OUTPATIENT
Start: 2022-02-18 | End: 2022-03-20

## 2022-02-18 NOTE — PROGRESS NOTES
Chief Complaint   Cough and Congestion      History of Present Illness   Source of history provided by: patient. Jude Urbano is a 24 y.o. old male who presents to walk-in care for evaluation of nasal congestion X 6 days. Associated symptoms include headache, rhinorrhea, productive, sore throat and chills. Since onset symptoms have been about the same. They have not been diagnosed with COVID-19 in the past 90 days. The patient is not vaccinated. Has taken mucinex at home with some symptomatic relief. Denies any fever, chills, CP, dyspnea, LE edema, abdominal pain, vomiting, rash, or lethargy. Denies any hx of asthma, recurrent bronchitis, COPD, or pneumonia. Has no history of tobacco abuse. ROS   Pertinent positives and negatives are stated within HPI, all other systems reviewed and are negative. Past Medical History:  has a past medical history of ADHD (attention deficit hyperactivity disorder), GERD (gastroesophageal reflux disease), Mononucleosis, Right hip tendonitis, Right knee pain, Seasonal allergies, and Viral illness. Surgical History:  has no past surgical history on file. Social History:  reports that he has never smoked. His smokeless tobacco use includes chew. He reports that he does not use drugs. Family History: family history includes Allergies in his mother; Asthma in his mother; High Blood Pressure in his father. Allergies: Patient has no known allergies. Physical Exam      VS:  /76   Pulse 104   Temp 97.4 °F (36.3 °C) (Temporal)   Resp 18   Ht 6' 5\" (1.956 m)   Wt 216 lb (98 kg)   SpO2 98%   BMI 25.61 kg/m²    Oxygen Saturation Interpretation: Normal.    Constitutional:  Alert, development consistent with age. NAD. Head:  NC/NT. Airway patent. Mild TTP over maxillary and frontal sinuses. Ears: Bilateral external auditory ear canals are clear there is no cerumen, erythema, debris present.   Bilateral tympanic membranes intact, translucent, normal cone of light.  Nose: Bilateral turbinates swollen and moist.  No septal deviation. There is rhinorrhea present. Mouth: Posterior pharynx with mild erythema and clear postnasal drip. No tonsillar hypertrophy or exudate. Neck:  Normal ROM. Supple. No anterior cervical adenopathy noted. Lungs: CTAB without wheezes, rales, or rhonchi. CV:  Regular rate and rhythm, normal heart sounds, without pathological murmurs, ectopy, gallops, or rubs. Skin:  Normal turgor. Warm, dry, without visible rash. Lymphatic: No lymphangitis or adenopathy noted. Neurological:  Oriented. Motor functions intact. Lab / Imaging Results   (All laboratory and radiology results have been personally reviewed by myself)  Labs:  Results for orders placed or performed in visit on 02/18/22   POCT COVID-19, Antigen   Result Value Ref Range    SARS-COV-2, POC Not-Detected Not Detected    Lot Number [id number]     QC Pass/Fail pass     Performing Instrument BD Veritor        Imaging: All Radiology results interpreted by Radiologist unless otherwise noted. No results found. Medical Decision Making   Pt non-toxic, in no apparent distress and stable at time of discharge. Assessment/Plan   Carlton Maher was seen today for cough and congestion. Diagnoses and all orders for this visit:    Viral URI with cough  -     POCT COVID-19, Antigen  -     brompheniramine-pseudoephedrine-DM (BROMFED DM) 2-30-10 MG/5ML syrup; Take 5 mLs by mouth 4 times daily as needed for Congestion or Cough    Cough  -     POCT COVID-19, Antigen        Rapid Covid testing in office is negative. Current CDC guidelines were discussed regarding quarantine and when to discontinue quarantine. Regardless of testing results, pt should also be fever free for 24 hours and symptoms should be improved overall prior to returning. Increase fluids and rest.   Other symptomatic relief discussed including Tylenol prn pain/fever.  Schedule f/u with PCP in 7-10 days if symptoms persist.  Go to ED sooner if symptoms worsen or change. ED immediately with high or refractory fever, progressive SOB, dyspnea, CP, calf pain/swelling, shaking chills, vomiting, abdominal pain, lethargy, flank pain, or decreased urinary output. Pt verbalizes understanding and is in agreement with plan of care. All questions answered. DI Morales - NP    *NOTE: This report was transcribed using voice recognition software. Every effort was made to ensure accuracy; however, inadvertent computerized transcription errors may be present.

## 2022-04-22 DIAGNOSIS — F41.9 ANXIETY: ICD-10-CM

## 2022-04-22 RX ORDER — FLUOXETINE 10 MG/1
CAPSULE ORAL
Qty: 30 CAPSULE | Refills: 0 | Status: SHIPPED
Start: 2022-04-22 | End: 2022-08-26 | Stop reason: ALTCHOICE

## 2022-07-08 ENCOUNTER — OFFICE VISIT (OUTPATIENT)
Dept: FAMILY MEDICINE CLINIC | Age: 22
End: 2022-07-08
Payer: COMMERCIAL

## 2022-07-08 VITALS
SYSTOLIC BLOOD PRESSURE: 132 MMHG | WEIGHT: 216 LBS | HEART RATE: 70 BPM | BODY MASS INDEX: 25.5 KG/M2 | RESPIRATION RATE: 18 BRPM | DIASTOLIC BLOOD PRESSURE: 84 MMHG | OXYGEN SATURATION: 99 % | HEIGHT: 77 IN | TEMPERATURE: 97.5 F

## 2022-07-08 DIAGNOSIS — S50.862A INSECT BITE OF LEFT FOREARM, INITIAL ENCOUNTER: Primary | ICD-10-CM
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
## 2022-07-08 DIAGNOSIS — L03.114 CELLULITIS OF LEFT UPPER EXTREMITY: ICD-10-CM

## 2022-07-08 PROCEDURE — 99214 OFFICE O/P EST MOD 30 MIN: CPT | Performed by: NURSE PRACTITIONER

## 2022-07-08 NOTE — PROGRESS NOTES
22  Janett Cisneros : 2000 Sex: male  Age 89 [facility] y.o. Subjective:  Chief Complaint   Patient presents with    Insect Bite       HPI:   Janett Cinseros , 89 [facility] y.o. male presents to the clinic for evaluation of insect bite to left arm x 2 days. The patient also reports the area is warm to touch, moderately painful, and swollen. The patient also reports red streaking up inner arm. No bleeding or active drainage. The patient has not taken any treatment for symptoms. The patient reports worsening symptoms over time. The patient also denies fever, myalgia, arthralgia, chills, and lethargy. The patient also denies headache, chest pain, abdominal pain, shortness of breath, and nausea / vomiting / diarrhea. ROS:   Unless otherwise stated in this report the patient's positive and negative responses for review of systems for constitutional, eyes, ENT, cardiovascular, respiratory, gastrointestinal, neurological, , musculoskeletal, and integument systems and related systems to the presenting problem are either stated in the history of present illness or were not pertinent or were negative for the symptoms and/or complaints related to the presenting medical problem. Positives and pertinent negatives as per HPI. All others reviewed and are negative. PMH:     Past Medical History:   Diagnosis Date    ADHD (attention deficit hyperactivity disorder) 03/15/2019    trail wellbutrin    GERD (gastroesophageal reflux disease) 2018    trail omeprazole    Mononucleosis 2017    significant elevation of LFTs    Right hip tendonitis 2017    Right knee pain 2018    current eval by ortho    Seasonal allergies     Viral illness 2014    with rash, fever, elevation of LFTs       No past surgical history on file.     Family History   Problem Relation Age of Onset    Allergies Mother     Asthma Mother     High Blood Pressure Father        Medications:     Current Outpatient Medications:    patient's vitals, allergies, medications, and past medical history have been reviewed. Ezio was seen today for insect bite. Diagnoses and all orders for this visit:    Insect bite of left forearm, initial encounter    Cellulitis of left upper extremity        - Disposition: ED    - Educational material printed for patient's review and were included in patient instructions. After Visit Summary was given to patient at the end of visit. - Pt advised that a comprehensive workup is unable to be performed in an ready care setting. Pt advised to go straight to the ED for further evaluation and management. Pt agreed with this care plan and agreed to go immediately by private vehicle. Pt left our office in stable condition. Further disposition to follow. All questions answered. SIGNATURE: DI Little-IVANIA    *NOTE: This report was transcribed using voice recognition software. Every effort was made to ensure accuracy; however, inadvertent computerized transcription errors may be present.

## 2022-08-22 ENCOUNTER — TELEPHONE (OUTPATIENT)
Dept: FAMILY MEDICINE CLINIC | Age: 22
End: 2022-08-22

## 2022-08-22 NOTE — TELEPHONE ENCOUNTER
Meena Rivera called in he is wanting to be seen ASAP he thinks he has a hernia. I did see an opening tomorrow but I wasn't sure if I should schedule him since he has not been seen for 2 years. Would you like me to schedule him?

## 2022-08-26 ENCOUNTER — OFFICE VISIT (OUTPATIENT)
Dept: FAMILY MEDICINE CLINIC | Age: 22
End: 2022-08-26
Payer: COMMERCIAL

## 2022-08-26 ENCOUNTER — OFFICE VISIT (OUTPATIENT)
Dept: ORTHOPEDIC SURGERY | Age: 22
End: 2022-08-26
Payer: COMMERCIAL

## 2022-08-26 VITALS — BODY MASS INDEX: 25.5 KG/M2 | HEIGHT: 77 IN | WEIGHT: 216 LBS

## 2022-08-26 VITALS
HEART RATE: 93 BPM | SYSTOLIC BLOOD PRESSURE: 136 MMHG | DIASTOLIC BLOOD PRESSURE: 72 MMHG | TEMPERATURE: 97.3 F | OXYGEN SATURATION: 99 % | BODY MASS INDEX: 25.5 KG/M2 | HEIGHT: 77 IN | WEIGHT: 216 LBS

## 2022-08-26 DIAGNOSIS — R10.9 ABDOMINAL PRESSURE: Primary | ICD-10-CM

## 2022-08-26 DIAGNOSIS — M70.21 OLECRANON BURSITIS OF RIGHT ELBOW: Primary | ICD-10-CM

## 2022-08-26 DIAGNOSIS — M25.522 LEFT ELBOW PAIN: ICD-10-CM

## 2022-08-26 DIAGNOSIS — M70.21 OLECRANON BURSITIS OF RIGHT ELBOW: ICD-10-CM

## 2022-08-26 LAB
APPEARANCE FLUID: NORMAL
CELL COUNT FLUID TYPE: NORMAL
COLOR FLUID: NORMAL
MONOCYTE, FLUID: 13 %
NEUTROPHIL, FLUID: 87 %
NUCLEATED CELLS FLUID: 7720 /UL
RBC FLUID: NORMAL /UL

## 2022-08-26 PROCEDURE — 99213 OFFICE O/P EST LOW 20 MIN: CPT | Performed by: INTERNAL MEDICINE

## 2022-08-26 PROCEDURE — 99213 OFFICE O/P EST LOW 20 MIN: CPT | Performed by: PHYSICIAN ASSISTANT

## 2022-08-26 PROCEDURE — 20605 DRAIN/INJ JOINT/BURSA W/O US: CPT | Performed by: PHYSICIAN ASSISTANT

## 2022-08-26 RX ORDER — CEPHALEXIN 500 MG/1
500 CAPSULE ORAL 4 TIMES DAILY
Qty: 28 CAPSULE | Refills: 0 | Status: SHIPPED | OUTPATIENT
Start: 2022-08-26 | End: 2022-09-02

## 2022-08-26 RX ORDER — IBUPROFEN 800 MG/1
800 TABLET ORAL EVERY 8 HOURS PRN
Qty: 21 TABLET | Refills: 0 | Status: SHIPPED | OUTPATIENT
Start: 2022-08-26 | End: 2022-09-02

## 2022-08-26 ASSESSMENT — PATIENT HEALTH QUESTIONNAIRE - PHQ9
SUM OF ALL RESPONSES TO PHQ QUESTIONS 1-9: 0
1. LITTLE INTEREST OR PLEASURE IN DOING THINGS: 0
SUM OF ALL RESPONSES TO PHQ QUESTIONS 1-9: 0
SUM OF ALL RESPONSES TO PHQ9 QUESTIONS 1 & 2: 0
2. FEELING DOWN, DEPRESSED OR HOPELESS: 0

## 2022-08-26 NOTE — PATIENT INSTRUCTIONS
At this time I have recommended starting an anti-inflammatory,  IBUPROFEN 800MG 1 TAB EVERY 8 HOURS , with GI precautions. If patient would develop any GI upset, nausea, vomiting, change in appetite, blood in stools he should discontinue the medication and contact our office immediately. They are also aware that he should not take any other over-the-counter anti-inflammatories while on this. They can use Tylenol 500 mg 2 tablets every 8 hours as needed for pain in addition to the prescription anti-inflammatory provided with the visit today. *IF DEVELOP ANY INCREASE IN REDNESS, WARMTH, SWELLING, DRAINAGE, FEVERS, WOULD NEED TO PROCEED TO ED FOR FURTHER EVALUATION.

## 2022-08-26 NOTE — PROGRESS NOTES
[address] [facility],[address] Floor In Care  New Patient Note      CHIEF COMPLAINT:   Chief Complaint   Patient presents with    Arm Pain     Pt presents this AM with c/o R elbow pain. States that he started noticing pain in elbow while driving home yesterday. Unsure of specific cause. Notes tingling down forearm into pinky, and also notes difficulty with extension. Visted ER. X-rays were taken. Took Vicodin, and was given a pain patch. HISTORY OF PRESENT ILLNESS:                The patient is a 24 y.o. male who presents today with The patient is a 24 y.o. male who presents today with complaints of right elbow pain that began yesterday at when he was coming down off a ladder at work and hit his elbow on the iron ladder step. He did have some pain initially but the pain has worsened over the last 24 hours. He was initially seen at [facility] emergency room last night had x-rays and the report shows no evidence of fracture or dislocation. Unfortunately, we do not do x-rays in the office today. He localizes pain to the right olecranon bursa. States pain is worse with palpation as well as flexion and extension of the elbow. He has not tried any at home therapies for comfort. He was prescribed naproxen at [facility] ER yesterday but has not picked up the prescription yet. He has never injured this elbow in the past..       Past Medical History:        Diagnosis Date    ADHD (attention deficit hyperactivity disorder) 03/15/2019    trail wellbutrin    GERD (gastroesophageal reflux disease) 07/31/2018    trail omeprazole    Mononucleosis 03/2017    significant elevation of LFTs    Right hip tendonitis 01/09/2017    Right knee pain 07/31/2018    current eval by ortho    Seasonal allergies     Viral illness 08/2014    with rash, fever, elevation of LFTs     Past Surgical History:    No past surgical history on file. Current Medications:   No current facility-administered medications for this visit.   Allergies:  Patient has no known allergies. Social History:   TOBACCO:   reports that he has never smoked. His smokeless tobacco use includes chew. ETOH:   has no history on file for alcohol use. DRUGS:   reports no history of drug use. OCCUPATION:  [employer]    Review of Systems   Constitutional: Negative for fever, chills, diaphoresis, appetite change and fatigue. HENT: Negative for dental issues, hearing loss and tinnitus. Negative for congestion, sinus pressure, sneezing, sore throat. Negative for headache. Eyes: Negative for visual disturbance, blurred and double vision. Negative for pain, discharge, redness and itching  Respiratory: Negative for cough, shortness of breath and wheezing. Cardiovascular: Negative for chest pain, palpitations and leg swelling. No dyspnea on exertion   Gastrointestinal:   Negative for nausea, vomiting, abdominal pain, diarrhea, constipation  or black or bloody. Hematologic\Lymphatic:  negative for bleeding, petechiae,   Genitourinary: Negative for hematuria and difficulty urinating. Musculoskeletal: Negative for neck pain and stiffness. Negative for back pain, joint swelling and gait problem. +right elbow pain, erythema, swelling  Skin: Negative for pallor, rash and wound. Neurological: Negative for dizziness, tremors, seizures, weakness, light-headedness, no TIA or stroke symptoms. No numbness and headaches. Psychiatric/Behavioral: Negative.      Physical Examination:   General appearance: alert, well appearing, and in no distress,  normal appearing weight  Mental status: alert, oriented to person, place, and time, normal mood, behavior, speech, dress, motor activity, and thought processes  Resp:   resp easy and unlabored, no audible wheezes note  Cardiac: distal pulses palpable, skin well perfused  Neurological: alert, oriented X3, normal speech, no focal findings or movement disorder noted, motor and sensory grossly normal bilaterally, normal muscle tone  HEENT: normochephalic fracture or dislocation. The imaging will be reviewed and interpreted by Radiologist.  The report was not complete at the time of this note. Please refer to Radiologist report for their interpretation. ASSESSMENT:   Diagnosis Orders   1. Olecranon bursitis of right elbow  cephALEXin (KEFLEX) 500 MG capsule    ibuprofen (ADVIL;MOTRIN) 800 MG tablet    Cell Count with Differential, Body Fluid    Culture, Body Fluid          PLAN:  Patient is a pleasant 21-year-old male who presents to the clinic today for evaluation of right elbow pain that began after he hit his elbow directly on a metal ladder stairstep yesterday. On exam he has mild to moderate edema of the right olecranon bursa with mild erythema and warmth. He has limited range of motion with flexion and extension secondary to swelling and pain. He is very hypersensitive of the elbow and that area making the exam difficult. I did recommend that we aspirate the bursa at this time and send the fluid for cultures to rule out infection. Patient agreed with the plan. I was able to aspirate approximately 10 cc of bloody fluid from the olecranon bursa. I sent the fluid to the labs for stat analysis for aerobic, anaerobic, fungal and AFB as well as a cell count with differential.  I did asked that the labs be held for 14 days to rule out slow-growing bacteria. I did advise the patient he has any increase in pain, swelling, redness or develops drainage or fevers he would need to proceed to the local emergency room urgently over the weekend to rule out infection. I am also going to start him on Keflex 500 mg 1 tablet every 6 hours for 7 days dispense 28 tablets with 0 refills. Once we have the culture report back if the antibiotic does not cover we will change his antibiotic as needed. I did recommend ice 20 minutes on 20 minutes off repeating throughout the day as necessary.   At this time I have recommended starting an anti-inflammatory, ibuprofen 800 mg 1 tab  , with GI precautions. If patient would develop any GI upset, nausea, vomiting, change in appetite, blood in stools he should discontinue the medication and contact our office immediately. They are also aware that he should not take any other over-the-counter anti-inflammatories while on this. They can use Tylenol 500 mg 2 tablets every 8 hours as needed for pain in addition to the prescription anti-inflammatory provided with the visit today. I will call the patient once we have culture reports back. Patient voiced understanding and agrees with the treatment plan outlined for them in the office today. If they have any additional questions or concerns they should call the office. Electronically signed by Gina Zurita PA-C on 8/26/22 at 9:48 AM EDT    **This report was transcribed using voice recognition software. Every effort was made to ensure accuracy; however, inadvertent computerized transcription errors may be present. **

## 2022-08-26 NOTE — PROGRESS NOTES
[address] [facility],7Th Floor In Care  New Patient Note      CHIEF COMPLAINT:   Chief Complaint   Patient presents with    Arm Pain     Pt presents this AM with c/o R elbow pain. States that he started noticing pain in elbow while driving home yesterday. Unsure of specific cause. Notes tingling down forearm into pinky, and also notes difficulty with extension. Visted ER. X-rays were taken. Took Vicodin, and was given a pain patch. HISTORY OF PRESENT ILLNESS:                The patient is a 24 y.o. male who presents today with complaints of right elbow pain that began yesterday at when he was coming down off a ladder at work and hit his elbow on the iron ladder step. He did have some pain initially but the pain has worsened over the last 24 hours. He was initially seen at [facility] emergency room last night had x-rays and the report shows no evidence of fracture or dislocation. Unfortunately, we do not do x-rays in the office today. He localizes pain to the right olecranon bursa. States pain is worse with palpation as well as flexion and extension of the elbow. He has not tried any at home therapies for comfort. He was prescribed naproxen at [facility] ER yesterday but has not picked up the prescription yet. He has never injured this elbow in the past.       Past Medical History:        Diagnosis Date    ADHD (attention deficit hyperactivity disorder) 03/15/2019    trail wellbutrin    GERD (gastroesophageal reflux disease) 07/31/2018    trail omeprazole    Mononucleosis 03/2017    significant elevation of LFTs    Right hip tendonitis 01/09/2017    Right knee pain 07/31/2018    current eval by ortho    Seasonal allergies     Viral illness 08/2014    with rash, fever, elevation of LFTs     Past Surgical History:    No past surgical history on file. Current Medications:   No current facility-administered medications for this visit. Allergies:  Patient has no known allergies.     Social History:   TOBACCO:   reports that he has never smoked. His smokeless tobacco use includes chew. ETOH:   has no history on file for alcohol use. DRUGS:   reports no history of drug use. OCCUPATION:  [employer]    Review of Systems   Constitutional: Negative for fever, chills, diaphoresis, appetite change and fatigue. HENT: Negative for dental issues, hearing loss and tinnitus. Negative for congestion, sinus pressure, sneezing, sore throat. Negative for headache. Eyes: Negative for visual disturbance, blurred and double vision. Negative for pain, discharge, redness and itching  Respiratory: Negative for cough, shortness of breath and wheezing. Cardiovascular: Negative for chest pain, palpitations and leg swelling. No dyspnea on exertion   Gastrointestinal:   Negative for nausea, vomiting, abdominal pain, diarrhea, constipation  or black or bloody. Hematologic\Lymphatic:  negative for bleeding, petechiae,   Genitourinary: Negative for hematuria and difficulty urinating. Musculoskeletal: Negative for neck pain and stiffness. Negative for back pain, joint swelling and gait problem. +right elbow pain, erythema, swelling  Skin: Negative for pallor, rash and wound. Neurological: Negative for dizziness, tremors, seizures, weakness, light-headedness, no TIA or stroke symptoms. No numbness and headaches. Psychiatric/Behavioral: Negative.      Physical Examination:   General appearance: alert, well appearing, and in no distress,  normal appearing weight  Mental status: alert, oriented to person, place, and time, normal mood, behavior, speech, dress, motor activity, and thought processes  Resp:   resp easy and unlabored, no audible wheezes note  Cardiac: distal pulses palpable, skin well perfused  Neurological: alert, oriented X3, normal speech, no focal findings or movement disorder noted, motor and sensory grossly normal bilaterally, normal muscle tone  HEENT: normochephalic atraumatic, external ears and eyes normal, sclera normal, neck supple  Extremities:   peripheral pulses normal, no edema, redness or tenderness in the calves   Skin: normal coloration, no rashes or open wounds, no suspicious skin lesions noted  Psych: Affect euthymic   Musculoskeletal:   Elbow Exam: On visual inspection there is no obvious deformity to the {RIGHT/LEFT:16} elbow, no erythema, edema, ecchymosis or open wounds. There is no decreased sensation to light touch throughout the entire upper extremity. Pt is grossly neurovascularly intact throughout the upper extremity. Right Elbow: Patient is tenderness to palpation at the ***, not tender to palpation *** in the elbow, forearm or wrist.  Active range of motion *** flexion, *** extension. MMT ***/5 Flexion, ***/5 Extension, ***/5 Pronation, ***/5 Supination. Strong radial pulse noted. (-) Tinels at the elbow, (-) Varus stress test, (-) Valgus stress test    Procedure:  Right Elbow aspiration    The risks and benefits of right elbow aspiration were explained and the patient elected to proceed. After the patient provided verbal consent for the procedure, the Right elbow aspiration was prepped in sterile fashion and the skin was anesthetized with ethyl chloride spray. The joint was entered from a posterior position with an 21 g needle and 10cc of bloody fluid was aspirated without difficulty. The fluid was sent to the lab for cultures. The site was covered with a bandaid. The patient tolerated well. Ht 6' 5\" (1.956 m)   Wt 216 lb (98 kg)   BMI 25.61 kg/m²      XR: ***    The imaging will be reviewed and interpreted by Radiologist.  The report was not complete at the time of this note. Please refer to Radiologist report for their interpretation. ASSESSMENT:   Diagnosis Orders   1.  Olecranon bursitis of right elbow  cephALEXin (KEFLEX) 500 MG capsule    ibuprofen (ADVIL;MOTRIN) 800 MG tablet    Cell Count with Differential, Body Fluid    Culture, Body Fluid          PLAN:  ***   Pt

## 2022-08-28 ASSESSMENT — ENCOUNTER SYMPTOMS
DIARRHEA: 0
BLOOD IN STOOL: 0
VOMITING: 0
COUGH: 0
SHORTNESS OF BREATH: 0
NAUSEA: 0
CONSTIPATION: 0
COLOR CHANGE: 1

## 2022-08-28 NOTE — PROGRESS NOTES
[address] [place]     [address]  Rosalita Clamp : 2000 Sex: male  Age: 24 y.o. Chief Complaint   Patient presents with    Hernia     Thinks he might have a hernia by his belly button; feels discomfort when he moves certain ways and gets a fullness feeling when he bends over; states there is some discoloration around his belly button area, like a purple color       HPI    Patient presents to express care today planing of concern about possible umbilical hernia. He apparently called his PCP this morning who told him to come here for me to evaluate. States he has noticed for about a month that that he has had pressure type sensation around the bellybutton area. Does not notice any bulging. States this more like a fullness type sensation. Admits to some occasional discomfort when he moves a certain way. No recent injuries. He does do lifting at work. As a by the way as we are finishing up visit for the above he tells me that he injured his left elbow in the last few days on a ladder rung. Went to the emergency room where they did x-rays and was told nothing was broken. They recommended icing. It is now red swollen and he cannot straighten it out completely. Review of Systems   Constitutional:  Negative for appetite change, chills and fever. Respiratory:  Negative for cough and shortness of breath. Cardiovascular:  Negative for chest pain. Gastrointestinal:  Negative for blood in stool, constipation, diarrhea, nausea and vomiting. Abdominal pressure and fullness periumbilical area-see above   Genitourinary: Negative. Musculoskeletal:         See above regarding left elbow   Skin:  Positive for color change. Neurological:  Negative for weakness and numbness. REST OF PERTINENT ROS GONE OVER AND WAS NEGATIVE.                Current Outpatient Medications:     cephALEXin (KEFLEX) 500 MG capsule, Take 1 capsule by mouth 4 times daily for 7 days, Disp: 28 capsule, regular rhythm. Heart sounds: No murmur heard. Pulmonary:      Effort: Pulmonary effort is normal.      Breath sounds: Normal breath sounds. Abdominal:      General: Bowel sounds are normal. There is no distension. Palpations: Abdomen is soft. There is no mass. Tenderness: There is no abdominal tenderness. Hernia: No hernia is present. Comments: I could not appreciate any hernia to periumbilical area. There was no reproducible tenderness to palpation either at this time. Musculoskeletal:         General: Swelling and tenderness present. Cervical back: Normal range of motion and neck supple. No tenderness. Comments: Semination to the left elbow demonstrates redness swelling and tenderness with some limitation to full extension. Skin:     General: Skin is warm and dry. Findings: Erythema present. No bruising. Neurological:      General: No focal deficit present. Mental Status: He is alert and oriented to person, place, and time. Sensory: No sensory deficit. Motor: No weakness. Psychiatric:         Mood and Affect: Mood normal.         Behavior: Behavior normal.         Thought Content: Thought content normal.         Judgment: Judgment normal.               Assessment and Plan:  Alysha Nicholas was seen today for hernia. Diagnoses and all orders for this visit:    Abdominal pressure  -     US ABDOMEN COMPLETE; Future  -     [facility] In    Left elbow pain    Plan: Regarding the abdominal situation I did order an ultrasound. Consideration to have surgery look at him if symptoms persist.  I did send him next-door to orthopedic walk-in regarding his elbow. Follow-up with PCP. Notify us if not improving. Return for fu pcp. Seen By:  Chung Patel MD      *Document was created using voice recognition software. Note was reviewed however may contain grammatical errors.

## 2022-08-29 ENCOUNTER — TELEPHONE (OUTPATIENT)
Dept: ORTHOPEDIC SURGERY | Age: 22
End: 2022-08-29

## 2022-08-29 NOTE — TELEPHONE ENCOUNTER
I called the patient to follow-up after their visit to the orthopedic walk-in care on 8/26/22. I wanted to check on his elbow and confirm antibiotic usage. The patient was unavailable and his mailbox was full. I will try again at a later time. Pt returned call shortly after and states his elbow is doing much better. Swelling is down and pain has diminished. It is still slightly tender if he bumps it. I did tell him that his cultures are not final but they are assessing it for Staph which would be covered by the Keflex. He will continue to monitor and alert me of any changes.

## 2022-08-30 ENCOUNTER — TELEPHONE (OUTPATIENT)
Dept: ORTHOPEDIC SURGERY | Age: 22
End: 2022-08-30

## 2022-08-30 DIAGNOSIS — M71.121: Primary | ICD-10-CM

## 2022-08-30 LAB
BODY FLUID CULTURE, STERILE: ABNORMAL
BODY FLUID CULTURE, STERILE: ABNORMAL
GRAM STAIN RESULT: ABNORMAL
ORGANISM: ABNORMAL

## 2022-08-30 RX ORDER — SULFAMETHOXAZOLE AND TRIMETHOPRIM 800; 160 MG/1; MG/1
1 TABLET ORAL 2 TIMES DAILY
Qty: 20 TABLET | Refills: 0 | Status: SHIPPED | OUTPATIENT
Start: 2022-08-30 | End: 2022-09-09

## 2022-08-30 RX ORDER — SULFAMETHOXAZOLE AND TRIMETHOPRIM 800; 160 MG/1; MG/1
1 TABLET ORAL 2 TIMES DAILY
Qty: 20 TABLET | Refills: 0 | Status: SHIPPED | OUTPATIENT
Start: 2022-08-30 | End: 2022-08-30

## 2022-08-30 NOTE — TELEPHONE ENCOUNTER
Pt elbow cultures did grow Staph. We need to add a second antibiotic based on resistance. I have sent Bactrim to his pharmacy, he will take 1 tablet BID x 10 days. I want to see him back next week for f/u. Pt was unavailable so a generic message was left asking him to call the office at his convenience.

## 2022-10-27 ENCOUNTER — OFFICE VISIT (OUTPATIENT)
Dept: FAMILY MEDICINE CLINIC | Age: 22
End: 2022-10-27
Payer: COMMERCIAL

## 2022-10-27 VITALS
OXYGEN SATURATION: 98 % | TEMPERATURE: 97 F | HEART RATE: 72 BPM | BODY MASS INDEX: 25.5 KG/M2 | DIASTOLIC BLOOD PRESSURE: 84 MMHG | HEIGHT: 77 IN | SYSTOLIC BLOOD PRESSURE: 132 MMHG | WEIGHT: 216 LBS | RESPIRATION RATE: 20 BRPM

## 2022-10-27 DIAGNOSIS — J30.89 NON-SEASONAL ALLERGIC RHINITIS, UNSPECIFIED TRIGGER: ICD-10-CM

## 2022-10-27 DIAGNOSIS — J45.21 MILD INTERMITTENT ASTHMATIC BRONCHITIS WITH ACUTE EXACERBATION: Primary | ICD-10-CM

## 2022-10-27 PROCEDURE — 99213 OFFICE O/P EST LOW 20 MIN: CPT | Performed by: EMERGENCY MEDICINE

## 2022-10-27 RX ORDER — DOXYCYCLINE HYCLATE 100 MG
100 TABLET ORAL 2 TIMES DAILY
Qty: 20 TABLET | Refills: 0 | Status: SHIPPED | OUTPATIENT
Start: 2022-10-27 | End: 2022-11-06

## 2022-10-27 RX ORDER — METHYLPREDNISOLONE 4 MG/1
TABLET ORAL
Qty: 1 KIT | Refills: 0 | Status: SHIPPED | OUTPATIENT
Start: 2022-10-27

## 2022-10-27 RX ORDER — CETIRIZINE HYDROCHLORIDE 10 MG/1
10 TABLET ORAL NIGHTLY
Qty: 30 TABLET | Refills: 0 | Status: SHIPPED | OUTPATIENT
Start: 2022-10-27 | End: 2022-11-26

## 2022-10-27 RX ORDER — ALBUTEROL SULFATE 90 UG/1
2 AEROSOL, METERED RESPIRATORY (INHALATION) 4 TIMES DAILY PRN
Qty: 18 G | Refills: 0 | Status: SHIPPED | OUTPATIENT
Start: 2022-10-27

## 2022-10-27 ASSESSMENT — ENCOUNTER SYMPTOMS
SINUS PRESSURE: 0
NAUSEA: 0
SORE THROAT: 0
VOMITING: 0
BACK PAIN: 0
RHINORRHEA: 1
DIARRHEA: 0
WHEEZING: 1
SHORTNESS OF BREATH: 0
CHEST TIGHTNESS: 1
EYE DISCHARGE: 0
ABDOMINAL PAIN: 0
EYE PAIN: 0
EYE REDNESS: 0
COUGH: 1

## 2022-10-27 NOTE — PROGRESS NOTES
Chief Complaint:   Other (/)      History of Present Illness   HPI:  Tariq Dos Santos is a 24 y.o. male who presents to [facility] [place] [place] today for recent travel to [facility] and back to [place]. Now allergies and asthma acitng up with tight chest , cough and wheezing. Prior to Visit Medications    Medication Sig Taking? Authorizing Provider   ibuprofen (ADVIL;MOTRIN) 800 MG tablet Take 1 tablet by mouth every 8 hours as needed for Pain Take with food, discontinue if GI upset  Anatoliy Masters PA-C       Review of Systems   Review of Systems   Constitutional:  Positive for activity change. Negative for chills and fever. HENT:  Positive for congestion, postnasal drip and rhinorrhea. Negative for ear pain, sinus pressure and sore throat. Eyes:  Negative for pain, discharge and redness. Respiratory:  Positive for cough, chest tightness and wheezing. Negative for shortness of breath. Cardiovascular:  Negative for chest pain. Gastrointestinal:  Negative for abdominal pain, diarrhea, nausea and vomiting. Genitourinary:  Negative for dysuria and frequency. Musculoskeletal:  Negative for arthralgias and back pain. Skin:  Negative for rash and wound. Neurological:  Negative for weakness and headaches. Hematological:  Negative for adenopathy. Psychiatric/Behavioral: Negative. All other systems reviewed and are negative. Patient's medical, social, and family history reviewed    Past Medical History:  has a past medical history of ADHD (attention deficit hyperactivity disorder), GERD (gastroesophageal reflux disease), Mononucleosis, Right hip tendonitis, Right knee pain, Seasonal allergies, and Viral illness. Past Surgical History:  has no past surgical history on file. Social History:  reports that he has never smoked. His smokeless tobacco use includes chew. He reports that he does not use drugs.   Family History: family history includes Allergies in his mother; Asthma in his mother; High Blood Pressure in his father. Allergies: Patient has no known allergies. Physical Exam   Vital Signs:  /84   Pulse 72   Temp 97 °F (36.1 °C) (Temporal)   Resp 20   Ht 6' 5\" (1.956 m)   Wt 216 lb (98 kg)   SpO2 98%   BMI 25.61 kg/m²    Oxygen Saturation Interpretation: Normal.    Physical Exam  Vitals and nursing note reviewed. Constitutional:       Appearance: He is well-developed. HENT:      Head: Normocephalic and atraumatic. Right Ear: Tympanic membrane normal.      Left Ear: Tympanic membrane normal.      Nose: Rhinorrhea present. Eyes:      Pupils: Pupils are equal, round, and reactive to light. Cardiovascular:      Rate and Rhythm: Normal rate and regular rhythm. Heart sounds: Normal heart sounds. No murmur heard. Pulmonary:      Effort: Pulmonary effort is normal. No respiratory distress. Breath sounds: Wheezing and rhonchi present. No rales. Abdominal:      General: Bowel sounds are normal.      Palpations: Abdomen is soft. Tenderness: There is no abdominal tenderness. There is no guarding or rebound. Musculoskeletal:      Cervical back: Normal range of motion and neck supple. Skin:     General: Skin is warm and dry. Neurological:      Mental Status: He is alert and oriented to person, place, and time. Cranial Nerves: No cranial nerve deficit. Coordination: Coordination normal.       Test Results Section   (All laboratory and radiology results have been personally reviewed by myself)  Labs:  No results found for this visit on 10/27/22. Imaging: All Radiology results interpreted by Radiologist unless otherwise noted. No results found. Assessment / Plan   Impression(s):  There are no diagnoses linked to this encounter. Discharged home. Patient condition is good    No follow-ups on file.      New Medications     New Prescriptions    No medications on file       Electronically signed by Glenys Colvin DO   DD: 10/27/22

## 2023-01-30 ENCOUNTER — OFFICE VISIT (OUTPATIENT)
Dept: ORTHOPEDIC SURGERY | Age: 23
End: 2023-01-30
Payer: COMMERCIAL

## 2023-01-30 VITALS — WEIGHT: 216 LBS | HEIGHT: 77 IN | BODY MASS INDEX: 25.5 KG/M2

## 2023-01-30 DIAGNOSIS — S62.144A CLOSED NONDISPLACED FRACTURE OF HAMATE BONE OF RIGHT WRIST, UNSPECIFIED PORTION OF HAMATE, INITIAL ENCOUNTER: Primary | ICD-10-CM

## 2023-01-30 DIAGNOSIS — S69.91XA HAND INJURY, RIGHT, INITIAL ENCOUNTER: ICD-10-CM

## 2023-01-30 PROCEDURE — 99213 OFFICE O/P EST LOW 20 MIN: CPT | Performed by: PHYSICIAN ASSISTANT

## 2023-01-30 PROCEDURE — 29125 APPL SHORT ARM SPLINT STATIC: CPT | Performed by: PHYSICIAN ASSISTANT

## 2023-01-30 RX ORDER — IBUPROFEN 800 MG/1
800 TABLET ORAL EVERY 8 HOURS PRN
Qty: 30 TABLET | Refills: 0 | Status: SHIPPED | OUTPATIENT
Start: 2023-01-30 | End: 2023-02-09

## 2023-01-30 NOTE — Clinical Note
R MANOLO FX, IMMOBILIZED IN ULNAR GUTTER SPLINT DUE TO EDEMA. PT IS  AND IS RIGHT HANDED. THANKS FOR YOUR OPINION.

## 2023-01-30 NOTE — PATIENT INSTRUCTIONS
*At this time I have recommended starting an anti-inflammatory, Ibuprofen 800mg 1 tablet by mouth daily every 8 hours as needed for pain, with GI precautions. If patient would develop any GI upset, nausea, vomiting, change in appetite, blood in stools he should discontinue the medication and contact our office immediately. They are also aware that he should not take any other over-the-counter anti-inflammatories while on this. They can use Tylenol 500 mg 2 tablets every 8 hours as needed for pain in addition to the prescription anti-inflammatory provided with the visit today.

## 2023-01-30 NOTE — Clinical Note
Hamate fx on imaging, pt in ulnar gutter splint.  He is right handed and works construction.  Thanks for opinion!

## 2023-01-30 NOTE — PROGRESS NOTES
[address] [facility],[address] In Care  New Patient Note      CHIEF COMPLAINT:   Chief Complaint   Patient presents with    Hand Pain     Pt presents this AM with c/o pain in R 5th finger. States that he fell while taking his dog out last night. Noticed \"a bone popped out in hand\" below 5th finger, and he popped it back in. Is noticing swelling this AM, as well as pain. Denies wrist pain, or pain in the finger itself. Pt is R handed. HISTORY OF PRESENT ILLNESS:                The patient is a 20 y.o. male who presents today with complaints of right hand pain that began last night when he took his dog out and he fell on an outstretched fist.  He localizes pain to the lateral aspect of the hand near the base of the fifth metacarpal.  He denies numbness, tingling, loss sensation or radiation of symptoms into the fingertips. Pain is worse with palpation. He has tried at home therapy of ibuprofen and ice. He has never broken this hand in the past but has broken the thumb. He is right-hand dominant. He does work in construction. Past Medical History:        Diagnosis Date    ADHD (attention deficit hyperactivity disorder) 03/15/2019    trail wellbutrin    GERD (gastroesophageal reflux disease) 07/31/2018    trail omeprazole    Mononucleosis 03/2017    significant elevation of LFTs    Right hip tendonitis 01/09/2017    Right knee pain 07/31/2018    current eval by ortho    Seasonal allergies     Viral illness 08/2014    with rash, fever, elevation of LFTs     Past Surgical History:    No past surgical history on file. Current Medications:   No current facility-administered medications for this visit. Allergies:  Patient has no known allergies. Social History:   TOBACCO:   reports that he has never smoked. His smokeless tobacco use includes chew. ETOH:   has no history on file for alcohol use. DRUGS:   reports no history of drug use.   OCCUPATION:  [place]    Review of Systems   Constitutional: Negative for fever, chills, diaphoresis, appetite change and fatigue. HENT: Negative for dental issues, hearing loss and tinnitus. Negative for congestion, sinus pressure, sneezing, sore throat. Negative for headache. Eyes: Negative for visual disturbance, blurred and double vision. Negative for pain, discharge, redness and itching  Respiratory: Negative for cough, shortness of breath and wheezing. Cardiovascular: Negative for chest pain, palpitations and leg swelling. No dyspnea on exertion   Gastrointestinal:   Negative for nausea, vomiting, abdominal pain, diarrhea, constipation  or black or bloody. Hematologic\Lymphatic:  negative for bleeding, petechiae,   Genitourinary: Negative for hematuria and difficulty urinating. Musculoskeletal: Negative for neck pain and stiffness. Negative for back pain, joint swelling and gait problem. +right hand pain  Skin: Negative for pallor, rash and wound. Neurological: Negative for dizziness, tremors, seizures, weakness, light-headedness, no TIA or stroke symptoms. No numbness and headaches. Psychiatric/Behavioral: Negative.      Physical Examination:   General appearance: alert, well appearing, and in no distress, weight appears normal  Mental status: alert, oriented to person, place, and time, normal mood, behavior, speech, dress, motor activity, and thought processes  Resp:   resp easy and unlabored, no audible wheezes note  Cardiac: distal pulses palpable, skin well perfused  Neurological: alert, oriented X3, normal speech, no focal findings or movement disorder noted, motor and sensory grossly normal bilaterally, normal muscle tone  HEENT: normochephalic atraumatic, external ears and eyes normal, sclera normal, neck supple  Extremities:   peripheral pulses normal, no edema, redness or tenderness in the calves   Skin: normal coloration, no rashes or open wounds, no suspicious skin lesions noted  Psych: Affect euthymic   Musculoskeletal:   Wrist/Hand:  On visual inspection there is no obvious deformity of the right wrist or hand. He does have moderate edema at the lateral aspect of the right hand near the base of the fifth metacarpal as well as diffuse ecchymosis on the volar and palmar side of the hand. There is no erythema, or open wounds. There is no decreased sensation to light touch throughout the right wrist or hand. Patient is grossly neurovascularly intact. Right wrist/hand: Patient is tender to palpation at the hamate, no tenderness to palpation elsewhere in the wrist or hand. Patient has full active range of motion of the wrist in all 4 planes without difficulty. Patient has full active range of motion of the fingers without difficulty. Strong radial pulse noted. Capillary refill > 2 seconds. (+) Hook of Hamate Pull Test    Ht 6' 5\" (1.956 m)   Wt 216 lb (98 kg)   BMI 25.61 kg/m²      XR: 3 view right hand x-rays where obtained in the clinic today which shows mildly displaced hamate fracture. The imaging will be reviewed and interpreted by Radiologist.  The report was not complete at the time of this note. Please refer to Radiologist report for their interpretation. Procedure:  A fiberglass ulnar gutter short arm splint was applied to right hand and forearm with the wrist in slight flexion and the fourth and fifth fingers flexed to approximately 80 degrees. Neurovascular status was checked pre and post application. Patient was neurovascularly intact after the application process. The patient denied any issues with fit or comfort of the cast/splint. Advised to avoid activities that put them at risk for falling. Patient instructed to call our office if there are any issues with the cast/splint. ASSESSMENT:   Diagnosis Orders   1. Closed nondisplaced fracture of hamate bone of right wrist, unspecified portion of hamate, initial encounter  ibuprofen (ADVIL;MOTRIN) 800 MG tablet    Ambulatory referral to Orthopedic Surgery      2.  Hand injury, right, initial encounter  XR HAND RIGHT (MIN 3 VIEWS)          PLAN:  Patient is a pleasant 74-year-old male who presents to the clinic today for evaluation of right hand pain that began last night when he took his dog out and he fell on an outstretched fist.  On exam he has moderate swelling on the lateral aspect of the hand near the base of the fifth metacarpal as well as diffuse ecchymosis on the volar and dorsal aspect of the hand in the same area. He has pain to palpation at the hamate. He has a positive Hook of hamate pull test.  He has full active range of motion of his wrist and fingers without difficulty. He denies any numbness or tingling and is neurovascularly intact throughout the hand. I did obtain three-view right hand x-rays in the clinic today which is a mildly displaced hamate fracture. At this time I recommend we treat this conservatively with a fiberglass ulnar gutter short arm splint with the wrist in slight flexion and the fourth and fifth fingers flexed approximately 80 degrees. He should be nonweightbearing through this extremity. Patient was provided with cast care instructions. I am going to refer him to Dr. 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 At this time I have recommended starting an anti-inflammatory, Ibuprofen 800mg 1 tablet by mouth daily every 8 hours as needed for pain, with GI precautions. If patient would develop any GI upset, nausea, vomiting, change in appetite, blood in stools he should discontinue the medication and contact our office immediately. They are also aware that he should not take any other over-the-counter anti-inflammatories while on this. They can use Tylenol 500 mg 2 tablets every 8 hours as needed for pain in addition to the prescription anti-inflammatory provided with the visit today.     Pt should apply ice to the effected area for no more than 20 minutes at a time repeating throughout the day as necessary. They should elevate the extremity and rest.     Patient voiced understanding and agrees with the treatment plan outlined for them in the office today. If they have any additional questions or concerns they should call the office. If he would develop increase in swelling, pain, loss of sensation, numbness, color or temperature changes in the fingers he would need to seek immediate medical attention to have splint removed. I will defer further treatment to Dr. Reese Chang office and see pt back on PRN basis for any conservative care needs. Electronically signed by Jackson Barahona PA-C on 1/30/23 at 8:20 AM EST    **This report was transcribed using voice recognition software. Every effort was made to ensure accuracy; however, inadvertent computerized transcription errors may be present. **

## 2023-02-02 ENCOUNTER — OFFICE VISIT (OUTPATIENT)
Dept: ORTHOPEDIC SURGERY | Age: 23
End: 2023-02-02
Payer: COMMERCIAL

## 2023-02-02 VITALS — BODY MASS INDEX: 25.39 KG/M2 | HEIGHT: 77 IN | WEIGHT: 215 LBS

## 2023-02-02 DIAGNOSIS — S62.144A CLOSED NONDISPLACED FRACTURE OF HAMATE BONE OF RIGHT WRIST, UNSPECIFIED PORTION OF HAMATE, INITIAL ENCOUNTER: Primary | ICD-10-CM

## 2023-02-02 PROCEDURE — 99203 OFFICE O/P NEW LOW 30 MIN: CPT | Performed by: ORTHOPAEDIC SURGERY

## 2023-02-02 NOTE — LETTER
[address] [facility].  [address] [address]  Phone: [phone]  Fax: [phone]    Dharmesh Boone MD        February 2, 2023     Patient: Cam Carver   YOB: 2000   Date of Visit: 2/2/2023       To Whom It May Concern: It is my medical opinion that Cam Carver may return to light duty immediately with the following restrictions: no work involving right hand, must wear splint/sling. If you have any questions or concerns, please don't hesitate to call.     Sincerely,        Dharmesh Boone MD

## 2023-02-02 NOTE — PROGRESS NOTES
Department of Orthopedic Surgery  History and Physical      CHIEF COMPLAINT: Right hand pain    HISTORY OF PRESENT ILLNESS:                The patient is a RHD 20 y.o. male who presents with right hand pain. Patient reports on Sunday night he was taking his dog out when he slipped and fell in between the driveway and his garage. He states he punched the ground. He had deformity to his right hand. He states that he \"popped\" this back in place. He went to the urgent care the following day where he was found to have a hamate fracture. He was placed into a splint. He follows up here today for further evaluation. He does work in construction and doing estimates. Past Medical History:        Diagnosis Date    ADHD (attention deficit hyperactivity disorder) 03/15/2019    trail wellbutrin    GERD (gastroesophageal reflux disease) 07/31/2018    trail omeprazole    Mononucleosis 03/2017    significant elevation of LFTs    Right hip tendonitis 01/09/2017    Right knee pain 07/31/2018    current eval by ortho    Seasonal allergies     Viral illness 08/2014    with rash, fever, elevation of LFTs     Past Surgical History:    History reviewed. No pertinent surgical history. Current Medications:   No current facility-administered medications for this visit. Allergies:  Patient has no known allergies. Social History:   TOBACCO:   reports that he has never smoked. His smokeless tobacco use includes chew. ETOH:   has no history on file for alcohol use. DRUGS:   reports no history of drug use.   ACTIVITIES OF DAILY LIVING:    OCCUPATION:    Family History:       Problem Relation Age of Onset    Allergies Mother     Asthma Mother     High Blood Pressure Father        REVIEW OF SYSTEMS:  CONSTITUTIONAL:  negative  EYES:  negative  HEENT:  negative  RESPIRATORY:  negative  CARDIOVASCULAR:  negative  GASTROINTESTINAL:  negative  GENITOURINARY:  negative  INTEGUMENT/BREAST:  negative  HEMATOLOGIC/LYMPHATIC: negative  ALLERGIC/IMMUNOLOGIC:  negative  ENDOCRINE:  negative  MUSCULOSKELETAL:  right hand pain  NEUROLOGICAL:  negative  BEHAVIOR/PSYCH:  negative    PHYSICAL EXAM:    VITALS:  Ht 6' 5\" (1.956 m)   Wt 215 lb (97.5 kg)   BMI 25.50 kg/m²   CONSTITUTIONAL:  awake, alert, cooperative, no apparent distress, and appears stated age  EYES:  Lids and lashes normal, pupils equal, round and reactive to light, extra ocular muscles intact, sclera clear, conjunctiva normal  ENT:  Normocephalic, without obvious abnormality, atraumatic, sinuses nontender on palpation, external ears without lesions, oral pharynx with moist mucus membranes, tonsils without erythema or exudates, gums normal and good dentition. NECK:  Supple, symmetrical, trachea midline, no adenopathy, thyroid symmetric, not enlarged and no tenderness, skin normal  NEUROLOGIC:  Awake, alert, oriented to name, place and time. Cranial nerves II-XII are grossly intact. Motor is 5 out of 5 bilaterally. Sensory is intact.  gait is normal.  MUSCULOSKELETAL:    Right upper extremity: skin intact. + swelling and ecchymosis to the right hand. Full flexion and extension of the fingers with appropriate mary and alignment. + tenderness over the hamate. Median, ulnar, radial nerves intact light touch. Brisk capillary refill.        DATA:    CBC:   Lab Results   Component Value Date/Time    WBC 7.4 04/22/2021 11:05 AM    RBC 5.38 04/22/2021 11:05 AM    HGB 16.4 04/22/2021 11:05 AM    HCT 48.9 04/22/2021 11:05 AM    MCV 90.9 04/22/2021 11:05 AM    MCH 30.5 04/22/2021 11:05 AM    MCHC 33.5 04/22/2021 11:05 AM    RDW 12.8 04/22/2021 11:05 AM     04/22/2021 11:05 AM    MPV 9.3 04/22/2021 11:05 AM     PT/INR:  No results found for: PROTIME, INR    Radiology Review:  Xray: x-rays of the right hand were reviewed from 1/30/23. 3 views: AP/lateral/oblique : demonstrate nondisplaced right hamate fracture  Impression: nondisplaced right hamate fracture     IMPRESSION:  Closed, nondisplaced right hamate fracture with CMC dislocation  GERD    PLAN:  Discussed findings with the patient at today's visit. Discussed conservative and surgical management with the patient. Discussed if there remains with no shift in his fracture we could consider non surgical management. Patient placed into a removable ulnar gutter splint today. Follow up in 1 week for repeat xrays. Did discuss if this does shift, he may need a CRPP. All questions answered.       I have seen and evaluated the patient and agree with the above assessment and plan on today's visit. I have performed the key components of the history and physical examination with significant findings of right dorsal hamate fracture with history of CMC dislocation. Discussed possible need for ORIF. Currently, fracture is well aligned. . I concur with the findings and plan as documented.    Jaylan Mccollum MD  2/2/2023

## 2023-02-09 ENCOUNTER — OFFICE VISIT (OUTPATIENT)
Dept: ORTHOPEDIC SURGERY | Age: 23
End: 2023-02-09

## 2023-02-09 VITALS — HEIGHT: 77 IN | BODY MASS INDEX: 25.39 KG/M2 | WEIGHT: 215 LBS

## 2023-02-09 DIAGNOSIS — S62.144A CLOSED NONDISPLACED FRACTURE OF HAMATE BONE OF RIGHT WRIST, UNSPECIFIED PORTION OF HAMATE, INITIAL ENCOUNTER: Primary | ICD-10-CM

## 2023-02-09 NOTE — PROGRESS NOTES
Chief Complaint   Patient presents with    Wrist Injury     1.5 weeks out. Closed, nondisplaced right hamate fracture with Aia 16 dislocation         Tristian Salazar is a 20y.o. year old  who presents for follow up of of nonoperative treatment of a right nondisplaced fracture of the hamate. He did have a Aia 16 dislocation by history. X-rays originally did not demonstrate this. He has been compliant with his ulnar gutter splinting. Only coming out of the shower. He states his pain is minimal and is not endorsing any numbness or tingling into the hand. Past Medical History:   Diagnosis Date    ADHD (attention deficit hyperactivity disorder) 03/15/2019    trail wellbutrin    GERD (gastroesophageal reflux disease) 07/31/2018    trail omeprazole    Mononucleosis 03/2017    significant elevation of LFTs    Right hip tendonitis 01/09/2017    Right knee pain 07/31/2018    current eval by ortho    Seasonal allergies     Viral illness 08/2014    with rash, fever, elevation of LFTs     History reviewed. No pertinent surgical history.     Current Outpatient Medications:     ibuprofen (ADVIL;MOTRIN) 800 MG tablet, Take 1 tablet by mouth every 8 hours as needed for Pain Take with food, discontinue if GI upset (Patient not taking: No sig reported), Disp: 30 tablet, Rfl: 0    methylPREDNISolone (MEDROL, BRIGIDA,) 4 MG tablet, Follow package instructions (Patient not taking: No sig reported), Disp: 1 kit, Rfl: 0    albuterol sulfate HFA (VENTOLIN HFA) 108 (90 Base) MCG/ACT inhaler, Inhale 2 puffs into the lungs 4 times daily as needed for Wheezing (Patient not taking: No sig reported), Disp: 18 g, Rfl: 0  No Known Allergies  Social History     Socioeconomic History    Marital status: Single     Spouse name: Not on file    Number of children: Not on file    Years of education: Not on file    Highest education level: Not on file   Occupational History    Not on file   Tobacco Use    Smoking status: Never    Smokeless tobacco: Current Types: Chew   Substance and Sexual Activity    Alcohol use: Not on file    Drug use: No    Sexual activity: Not on file   Other Topics Concern    Not on file   Social History Narrative    Not on file     Social Determinants of Health     Financial Resource Strain: Not on file   Food Insecurity: Not on file   Transportation Needs: Not on file   Physical Activity: Not on file   Stress: Not on file   Social Connections: Not on file   Intimate Partner Violence: Not on file   Housing Stability: Not on file     Family History   Problem Relation Age of Onset    Allergies Mother     Asthma Mother     High Blood Pressure Father        Skin: (-) rash,(-) psoriasis,(-) eczema, (-)skin cancer. Musculoskeletal: (-) fractures,  (-) dislocations,(-) collagen vascular disease, (-) fibromyalgia, (-) multiple sclerosis, (-) muscular dystrophy, (-) RSD,(-) joint pain (-)swelling, (-) joint pain,swelling. Neurologic: (-) epilepsy, (-)seizures,(-) brain tumor,(-) TIA, (-)stroke, (-)headaches, (-)Parkinson disease,(-) memory loss, (-) LOC. Cardiovascular: (-) Chest pain, (-) swelling in legs/feet, (-) SOB, (-) cramping in legs/feet with walking. SUBJECTIVE:      Constitutional:    The patient is alert and oriented x 3, appears to be stated age and in no distress. Right upper extremity: Skin intact circumferentially. Small area of ecchymosis over the dorsum of the hand. Mild tenderness palpation over the hamate dorsally. Fifth CMC is stable to translation. No pain with this. Able to make concentric fist.  Motor 5/5 grossly. Otherwise neurovascular intact. Xrays: X-rays of the right hand were obtained, reviewed today with the patient. These demonstrate the previously noted hamate fracture. Aia 16 joint is located. No fracture subluxation noted. No new fracture appreciated.   No other bony or soft tissue abnormalities noted  Impression: Redemonstration of previous hamate fracture, no changes  Radiographic findings reviewed with patient      Impression: Closed, right nondisplaced hamate fracture    Plan:  Discussed findings with the patient today. We will continue ulnar guarding splinting for all hours of the day except for daily hygiene for another 2 to 3 weeks. He will return to clinic we will repeat x-rays at that time we will consider weaning him out of the brace if he continues to be asymptomatic. He continue to take anti-inflammatories as needed for pain. All questions answered. I have seen and evaluated the patient and agree with the above assessment and plan on today's visit. I have performed the key components of the history and physical examination with significant findings of maintained fracture allignment. Continue brace. I concur with the findings and plan as documented.     Lisandro Mohan MD  2/9/2023

## 2023-03-02 ENCOUNTER — OFFICE VISIT (OUTPATIENT)
Dept: ORTHOPEDIC SURGERY | Age: 23
End: 2023-03-02

## 2023-03-02 VITALS — BODY MASS INDEX: 26.57 KG/M2 | HEIGHT: 77 IN | WEIGHT: 225 LBS

## 2023-03-02 DIAGNOSIS — S62.144A CLOSED NONDISPLACED FRACTURE OF HAMATE BONE OF RIGHT WRIST, UNSPECIFIED PORTION OF HAMATE, INITIAL ENCOUNTER: Primary | ICD-10-CM

## 2023-03-02 NOTE — PROGRESS NOTES
Department of Orthopedic Surgery  History and Physical      CHIEF COMPLAINT: Right hand pain    HISTORY OF PRESENT ILLNESS:                The patient is a RHD 20 y.o. male who presents with right hand pain. Patient reports 4.5 weeks ago night he was taking his dog out when he slipped and fell in between the driveway and his garage. He states he punched the ground. He had deformity to his right hand. He states that he \"popped\" this back in place. He went to the urgent care the following day where he was found to have a hamate fracture. He was placed into a splint. He does work in construction and doing estimates. He states he has been wearing his splint while at work. He states when he gets home he does take this off and work on range of motion exercises. He states since his last office visit he reports minimal to no pain or discomfort to his right hand. Past Medical History:        Diagnosis Date    ADHD (attention deficit hyperactivity disorder) 03/15/2019    trail wellbutrin    GERD (gastroesophageal reflux disease) 07/31/2018    trail omeprazole    Mononucleosis 03/2017    significant elevation of LFTs    Right hip tendonitis 01/09/2017    Right knee pain 07/31/2018    current eval by ortho    Seasonal allergies     Viral illness 08/2014    with rash, fever, elevation of LFTs     Past Surgical History:    History reviewed. No pertinent surgical history. Current Medications:   No current facility-administered medications for this visit. Allergies:  Patient has no known allergies. Social History:   TOBACCO:   reports that he has never smoked. His smokeless tobacco use includes chew. ETOH:   has no history on file for alcohol use. DRUGS:   reports no history of drug use.   ACTIVITIES OF DAILY LIVING:    OCCUPATION:    Family History:       Problem Relation Age of Onset    Allergies Mother     Asthma Mother     High Blood Pressure Father        REVIEW OF SYSTEMS:  CONSTITUTIONAL: negative  EYES:  negative  HEENT:  negative  RESPIRATORY:  negative  CARDIOVASCULAR:  negative  GASTROINTESTINAL:  negative  GENITOURINARY:  negative  INTEGUMENT/BREAST:  negative  HEMATOLOGIC/LYMPHATIC:  negative  ALLERGIC/IMMUNOLOGIC:  negative  ENDOCRINE:  negative  MUSCULOSKELETAL:  right hand pain, improving  NEUROLOGICAL:  negative  BEHAVIOR/PSYCH:  negative    PHYSICAL EXAM:    VITALS:  Ht 6' 5\" (1.956 m)   Wt 225 lb (102.1 kg)   BMI 26.68 kg/m²   CONSTITUTIONAL:  awake, alert, cooperative, no apparent distress, and appears stated age  EYES:  Lids and lashes normal, pupils equal, round and reactive to light, extra ocular muscles intact, sclera clear, conjunctiva normal  ENT:  Normocephalic, without obvious abnormality, atraumatic, sinuses nontender on palpation, external ears without lesions, oral pharynx with moist mucus membranes, tonsils without erythema or exudates, gums normal and good dentition. NECK:  Supple, symmetrical, trachea midline, no adenopathy, thyroid symmetric, not enlarged and no tenderness, skin normal  NEUROLOGIC:  Awake, alert, oriented to name, place and time. Cranial nerves II-XII are grossly intact. Motor is 5 out of 5 bilaterally. Sensory is intact.  gait is normal.  MUSCULOSKELETAL:    Right upper extremity: skin intact. Improved swelling. Full flexion and extension of the fingers with appropriate mary and alignment. - tenderness over the hamate. Median, ulnar, radial nerves intact light touch. Brisk capillary refill.        DATA:    CBC:   Lab Results   Component Value Date/Time    WBC 7.4 04/22/2021 11:05 AM    RBC 5.38 04/22/2021 11:05 AM    HGB 16.4 04/22/2021 11:05 AM    HCT 48.9 04/22/2021 11:05 AM    MCV 90.9 04/22/2021 11:05 AM    MCH 30.5 04/22/2021 11:05 AM    MCHC 33.5 04/22/2021 11:05 AM    RDW 12.8 04/22/2021 11:05 AM     04/22/2021 11:05 AM    MPV 9.3 04/22/2021 11:05 AM     PT/INR:  No results found for: PROTIME, INR    Radiology Review:  Xray: x-rays of the right hand were obtained today in the office and reviewed with the patient. 3 views: AP/lateral/oblique : demonstrate nondisplaced right hamate fracture with no interval changes in alignment from 2/9/23 and 1/30/23  Impression: nondisplaced right hamate fracture     IMPRESSION:  Closed, nondisplaced right hamate fracture with CMC dislocation  GERD    PLAN:  Okay to discontinue ulnar gutter length. Okay to advance activities as tolerated. No restrictions. Follow-up as needed. I have seen and evaluated the patient and agree with the above assessment and plan on today's visit. I have performed the key components of the history and physical examination with significant findings of healed fracture. I concur with the findings and plan as documented.     Grabiel Payne MD  3/2/2023

## 2023-08-29 RX ORDER — PROPRANOLOL HYDROCHLORIDE 20 MG/1
20 TABLET ORAL 2 TIMES DAILY
Qty: 60 TABLET | Refills: 0 | OUTPATIENT
Start: 2023-08-29

## 2023-08-29 NOTE — TELEPHONE ENCOUNTER
His appointment is 2 days with Kaz Salazar. I would hold off on prescribing medication because indications may have changed.

## 2023-08-29 NOTE — TELEPHONE ENCOUNTER
Pt called in asking if he could be prescribed propanolol again, he quit taking it a while ago, but now he feels like he really needs it again. He has not seen Dr. Melissa Washington since 6/8/20 and saw Ian Morales last on 2/26/21. He scheduled for 9/1 with Ian Morales. I let him know that she will likely need to see him before the medication is filled.

## 2023-09-01 ENCOUNTER — OFFICE VISIT (OUTPATIENT)
Dept: PRIMARY CARE CLINIC | Age: 23
End: 2023-09-01
Payer: COMMERCIAL

## 2023-09-01 VITALS
DIASTOLIC BLOOD PRESSURE: 80 MMHG | SYSTOLIC BLOOD PRESSURE: 122 MMHG | HEIGHT: 77 IN | BODY MASS INDEX: 26.57 KG/M2 | TEMPERATURE: 98.4 F | OXYGEN SATURATION: 98 % | WEIGHT: 225 LBS | HEART RATE: 92 BPM

## 2023-09-01 DIAGNOSIS — F41.1 GENERALIZED ANXIETY DISORDER: Primary | ICD-10-CM

## 2023-09-01 PROCEDURE — 99214 OFFICE O/P EST MOD 30 MIN: CPT | Performed by: NURSE PRACTITIONER

## 2023-09-01 RX ORDER — PROPRANOLOL HYDROCHLORIDE 20 MG/1
20 TABLET ORAL 2 TIMES DAILY
Qty: 60 TABLET | Refills: 5 | Status: SHIPPED | OUTPATIENT
Start: 2023-09-01 | End: 2024-02-28

## 2023-09-01 SDOH — ECONOMIC STABILITY: HOUSING INSECURITY
IN THE LAST 12 MONTHS, WAS THERE A TIME WHEN YOU DID NOT HAVE A STEADY PLACE TO SLEEP OR SLEPT IN A SHELTER (INCLUDING NOW)?: NO

## 2023-09-01 SDOH — ECONOMIC STABILITY: INCOME INSECURITY: HOW HARD IS IT FOR YOU TO PAY FOR THE VERY BASICS LIKE FOOD, HOUSING, MEDICAL CARE, AND HEATING?: NOT HARD AT ALL

## 2023-09-01 SDOH — ECONOMIC STABILITY: FOOD INSECURITY: WITHIN THE PAST 12 MONTHS, THE FOOD YOU BOUGHT JUST DIDN'T LAST AND YOU DIDN'T HAVE MONEY TO GET MORE.: NEVER TRUE

## 2023-09-01 SDOH — ECONOMIC STABILITY: FOOD INSECURITY: WITHIN THE PAST 12 MONTHS, YOU WORRIED THAT YOUR FOOD WOULD RUN OUT BEFORE YOU GOT MONEY TO BUY MORE.: NEVER TRUE

## 2023-09-01 ASSESSMENT — PATIENT HEALTH QUESTIONNAIRE - PHQ9
SUM OF ALL RESPONSES TO PHQ9 QUESTIONS 1 & 2: 0
2. FEELING DOWN, DEPRESSED OR HOPELESS: 0
SUM OF ALL RESPONSES TO PHQ QUESTIONS 1-9: 0
SUM OF ALL RESPONSES TO PHQ QUESTIONS 1-9: 0
1. LITTLE INTEREST OR PLEASURE IN DOING THINGS: 0
SUM OF ALL RESPONSES TO PHQ QUESTIONS 1-9: 0
SUM OF ALL RESPONSES TO PHQ QUESTIONS 1-9: 0

## 2023-09-08 DIAGNOSIS — F41.1 GENERALIZED ANXIETY DISORDER: ICD-10-CM

## 2023-09-08 LAB
ABSOLUTE IMMATURE GRANULOCYTE: <0.03 K/UL (ref 0–0.58)
ALBUMIN SERPL-MCNC: 4.7 G/DL (ref 3.5–5.2)
ALP BLD-CCNC: 73 U/L (ref 40–129)
ALT SERPL-CCNC: 25 U/L (ref 0–40)
ANION GAP SERPL CALCULATED.3IONS-SCNC: 8 MMOL/L (ref 7–16)
AST SERPL-CCNC: 25 U/L (ref 0–39)
BASOPHILS ABSOLUTE: 0.06 K/UL (ref 0–0.2)
BASOPHILS RELATIVE PERCENT: 1 % (ref 0–2)
BILIRUB SERPL-MCNC: 0.7 MG/DL (ref 0–1.2)
BUN BLDV-MCNC: 18 MG/DL (ref 6–20)
CALCIUM SERPL-MCNC: 9.4 MG/DL (ref 8.6–10.2)
CHLORIDE BLD-SCNC: 100 MMOL/L (ref 98–107)
CO2: 27 MMOL/L (ref 22–29)
CREAT SERPL-MCNC: 1.3 MG/DL (ref 0.7–1.2)
EOSINOPHILS ABSOLUTE: 0.2 K/UL (ref 0.05–0.5)
EOSINOPHILS RELATIVE PERCENT: 3 % (ref 0–6)
GFR SERPL CREATININE-BSD FRML MDRD: >60 ML/MIN/1.73M2
GLUCOSE BLD-MCNC: 55 MG/DL (ref 74–99)
HCT VFR BLD CALC: 44.9 % (ref 37–54)
HEMOGLOBIN: 14.9 G/DL (ref 12.5–16.5)
IMMATURE GRANULOCYTES: 0 % (ref 0–5)
LYMPHOCYTES ABSOLUTE: 2.12 K/UL (ref 1.5–4)
LYMPHOCYTES RELATIVE PERCENT: 32 % (ref 20–42)
MCH RBC QN AUTO: 30.6 PG (ref 26–35)
MCHC RBC AUTO-ENTMCNC: 33.2 G/DL (ref 32–34.5)
MCV RBC AUTO: 92.2 FL (ref 80–99.9)
MONOCYTES ABSOLUTE: 0.49 K/UL (ref 0.1–0.95)
MONOCYTES RELATIVE PERCENT: 8 % (ref 2–12)
NEUTROPHILS ABSOLUTE: 3.66 K/UL (ref 1.8–7.3)
NEUTROPHILS RELATIVE PERCENT: 56 % (ref 43–80)
PDW BLD-RTO: 12.3 % (ref 11.5–15)
PLATELET # BLD: 267 K/UL (ref 130–450)
PMV BLD AUTO: 9.5 FL (ref 7–12)
POTASSIUM SERPL-SCNC: 4.2 MMOL/L (ref 3.5–5)
RBC # BLD: 4.87 M/UL (ref 3.8–5.8)
SODIUM BLD-SCNC: 135 MMOL/L (ref 132–146)
TOTAL PROTEIN: 7.4 G/DL (ref 6.4–8.3)
TSH SERPL DL<=0.05 MIU/L-ACNC: 0.03 UIU/ML (ref 0.27–4.2)
WBC # BLD: 6.5 K/UL (ref 4.5–11.5)

## 2023-09-11 DIAGNOSIS — R79.89 ABNORMAL TSH: Primary | ICD-10-CM

## 2023-09-12 DIAGNOSIS — R79.89 ABNORMAL TSH: ICD-10-CM

## 2023-09-12 LAB — T4 FREE: 1.4 NG/DL (ref 0.9–1.7)

## 2023-09-14 DIAGNOSIS — N28.9 ABNORMAL KIDNEY FUNCTION: ICD-10-CM

## 2023-09-14 DIAGNOSIS — R94.6 ABNORMAL THYROID FUNCTION TEST: Primary | ICD-10-CM

## 2024-01-05 ENCOUNTER — APPOINTMENT (OUTPATIENT)
Dept: GENERAL RADIOLOGY | Age: 24
End: 2024-01-05
Payer: COMMERCIAL

## 2024-01-05 ENCOUNTER — HOSPITAL ENCOUNTER (EMERGENCY)
Age: 24
Discharge: HOME | End: 2024-01-05
Payer: COMMERCIAL

## 2024-01-05 ENCOUNTER — OFFICE VISIT (OUTPATIENT)
Dept: FAMILY MEDICINE CLINIC | Age: 24
End: 2024-01-05
Payer: COMMERCIAL

## 2024-01-05 VITALS
RESPIRATION RATE: 16 BRPM | SYSTOLIC BLOOD PRESSURE: 138 MMHG | HEART RATE: 89 BPM | TEMPERATURE: 97.2 F | OXYGEN SATURATION: 98 % | DIASTOLIC BLOOD PRESSURE: 76 MMHG

## 2024-01-05 VITALS
WEIGHT: 225 LBS | OXYGEN SATURATION: 99 % | SYSTOLIC BLOOD PRESSURE: 124 MMHG | RESPIRATION RATE: 18 BRPM | HEIGHT: 76 IN | BODY MASS INDEX: 27.4 KG/M2 | HEART RATE: 77 BPM | DIASTOLIC BLOOD PRESSURE: 72 MMHG | TEMPERATURE: 97.5 F

## 2024-01-05 DIAGNOSIS — R55 SYNCOPE, UNSPECIFIED SYNCOPE TYPE: Primary | ICD-10-CM

## 2024-01-05 DIAGNOSIS — R51.9 NONINTRACTABLE HEADACHE, UNSPECIFIED CHRONICITY PATTERN, UNSPECIFIED HEADACHE TYPE: ICD-10-CM

## 2024-01-05 DIAGNOSIS — R55 SYNCOPE AND COLLAPSE: Primary | ICD-10-CM

## 2024-01-05 DIAGNOSIS — R07.89 CHEST DISCOMFORT: ICD-10-CM

## 2024-01-05 LAB
ALBUMIN SERPL-MCNC: 5.3 G/DL (ref 3.5–5.2)
ALP SERPL-CCNC: 84 U/L (ref 40–129)
ALT SERPL-CCNC: 22 U/L (ref 0–40)
AMPHET UR QL SCN: NEGATIVE
ANION GAP SERPL CALCULATED.3IONS-SCNC: 12 MMOL/L (ref 7–16)
APAP SERPL-MCNC: <5 UG/ML (ref 10–30)
AST SERPL-CCNC: 23 U/L (ref 0–39)
BARBITURATES UR QL SCN: NEGATIVE
BASOPHILS # BLD: 0.04 K/UL (ref 0–0.2)
BASOPHILS NFR BLD: 1 % (ref 0–2)
BENZODIAZ UR QL: NEGATIVE
BILIRUB SERPL-MCNC: 1.1 MG/DL (ref 0–1.2)
BILIRUB UR QL STRIP: NEGATIVE
BUN SERPL-MCNC: 11 MG/DL (ref 6–20)
BUPRENORPHINE UR QL: NEGATIVE
CALCIUM SERPL-MCNC: 10.1 MG/DL (ref 8.6–10.2)
CANNABINOIDS UR QL SCN: POSITIVE
CHLORIDE SERPL-SCNC: 102 MMOL/L (ref 98–107)
CHP ED QC CHECK: NORMAL
CLARITY UR: CLEAR
CO2 SERPL-SCNC: 25 MMOL/L (ref 22–29)
COCAINE UR QL SCN: POSITIVE
COLOR UR: YELLOW
CREAT SERPL-MCNC: 1 MG/DL (ref 0.7–1.2)
EKG ATRIAL RATE: 56 BPM
EKG P AXIS: 34 DEGREES
EKG P-R INTERVAL: 144 MS
EKG Q-T INTERVAL: 424 MS
EKG QRS DURATION: 112 MS
EKG QTC CALCULATION (BAZETT): 409 MS
EKG R AXIS: 92 DEGREES
EKG T AXIS: 62 DEGREES
EKG VENTRICULAR RATE: 56 BPM
EOSINOPHIL # BLD: 0.03 K/UL (ref 0.05–0.5)
EOSINOPHILS RELATIVE PERCENT: 0 % (ref 0–6)
ERYTHROCYTE [DISTWIDTH] IN BLOOD BY AUTOMATED COUNT: 12.1 % (ref 11.5–15)
ETHANOLAMINE SERPL-MCNC: <10 MG/DL
FENTANYL UR QL: NEGATIVE
GFR SERPL CREATININE-BSD FRML MDRD: >60 ML/MIN/1.73M2
GLUCOSE BLD-MCNC: 108 MG/DL
GLUCOSE SERPL-MCNC: 96 MG/DL (ref 74–99)
GLUCOSE UR STRIP-MCNC: NEGATIVE MG/DL
HCT VFR BLD AUTO: 47.5 % (ref 37–54)
HGB BLD-MCNC: 16.8 G/DL (ref 12.5–16.5)
HGB UR QL STRIP.AUTO: NEGATIVE
IMM GRANULOCYTES # BLD AUTO: <0.03 K/UL (ref 0–0.58)
IMM GRANULOCYTES NFR BLD: 0 % (ref 0–5)
INFLUENZA A BY PCR: NOT DETECTED
INFLUENZA B BY PCR: NOT DETECTED
KETONES UR STRIP-MCNC: NEGATIVE MG/DL
LEUKOCYTE ESTERASE UR QL STRIP: NEGATIVE
LYMPHOCYTES NFR BLD: 1.59 K/UL (ref 1.5–4)
LYMPHOCYTES RELATIVE PERCENT: 23 % (ref 20–42)
MCH RBC QN AUTO: 30.2 PG (ref 26–35)
MCHC RBC AUTO-ENTMCNC: 35.4 G/DL (ref 32–34.5)
MCV RBC AUTO: 85.4 FL (ref 80–99.9)
METHADONE UR QL: NEGATIVE
MONOCYTES NFR BLD: 0.65 K/UL (ref 0.1–0.95)
MONOCYTES NFR BLD: 9 % (ref 2–12)
NEUTROPHILS NFR BLD: 67 % (ref 43–80)
NEUTS SEG NFR BLD: 4.69 K/UL (ref 1.8–7.3)
NITRITE UR QL STRIP: NEGATIVE
OPIATES UR QL SCN: NEGATIVE
OXYCODONE UR QL SCN: NEGATIVE
PCP UR QL SCN: NEGATIVE
PH UR STRIP: 6.5 [PH] (ref 5–9)
PLATELET # BLD AUTO: 254 K/UL (ref 130–450)
PMV BLD AUTO: 9 FL (ref 7–12)
POTASSIUM SERPL-SCNC: 3.8 MMOL/L (ref 3.5–5)
PROT SERPL-MCNC: 8.6 G/DL (ref 6.4–8.3)
PROT UR STRIP-MCNC: NEGATIVE MG/DL
RBC # BLD AUTO: 5.56 M/UL (ref 3.8–5.8)
RBC #/AREA URNS HPF: NORMAL /HPF
SALICYLATES SERPL-MCNC: <0.3 MG/DL (ref 0–30)
SARS-COV-2 RDRP RESP QL NAA+PROBE: NOT DETECTED
SODIUM SERPL-SCNC: 139 MMOL/L (ref 132–146)
SP GR UR STRIP: 1.01 (ref 1–1.03)
SPECIMEN DESCRIPTION: NORMAL
T4 SERPL-MCNC: 7.2 UG/DL (ref 4.5–11.7)
TEST INFORMATION: ABNORMAL
TOXIC TRICYCLIC SC,BLOOD: NEGATIVE
TROPONIN I SERPL HS-MCNC: <6 NG/L (ref 0–11)
TSH SERPL DL<=0.05 MIU/L-ACNC: 1.17 UIU/ML (ref 0.27–4.2)
UROBILINOGEN UR STRIP-ACNC: 0.2 EU/DL (ref 0–1)
WBC #/AREA URNS HPF: NORMAL /HPF
WBC OTHER # BLD: 7 K/UL (ref 4.5–11.5)

## 2024-01-05 PROCEDURE — 80307 DRUG TEST PRSMV CHEM ANLYZR: CPT

## 2024-01-05 PROCEDURE — 80053 COMPREHEN METABOLIC PANEL: CPT

## 2024-01-05 PROCEDURE — 80179 DRUG ASSAY SALICYLATE: CPT

## 2024-01-05 PROCEDURE — 2580000003 HC RX 258: Performed by: PHYSICIAN ASSISTANT

## 2024-01-05 PROCEDURE — 85025 COMPLETE CBC W/AUTO DIFF WBC: CPT

## 2024-01-05 PROCEDURE — 81001 URINALYSIS AUTO W/SCOPE: CPT

## 2024-01-05 PROCEDURE — 99285 EMERGENCY DEPT VISIT HI MDM: CPT

## 2024-01-05 PROCEDURE — G0480 DRUG TEST DEF 1-7 CLASSES: HCPCS

## 2024-01-05 PROCEDURE — 93000 ELECTROCARDIOGRAM COMPLETE: CPT | Performed by: NURSE PRACTITIONER

## 2024-01-05 PROCEDURE — 87502 INFLUENZA DNA AMP PROBE: CPT

## 2024-01-05 PROCEDURE — 87635 SARS-COV-2 COVID-19 AMP PRB: CPT

## 2024-01-05 PROCEDURE — 80143 DRUG ASSAY ACETAMINOPHEN: CPT

## 2024-01-05 PROCEDURE — 82962 GLUCOSE BLOOD TEST: CPT | Performed by: NURSE PRACTITIONER

## 2024-01-05 PROCEDURE — 99214 OFFICE O/P EST MOD 30 MIN: CPT | Performed by: NURSE PRACTITIONER

## 2024-01-05 PROCEDURE — 71046 X-RAY EXAM CHEST 2 VIEWS: CPT

## 2024-01-05 PROCEDURE — 84484 ASSAY OF TROPONIN QUANT: CPT

## 2024-01-05 PROCEDURE — 84443 ASSAY THYROID STIM HORMONE: CPT

## 2024-01-05 PROCEDURE — 84436 ASSAY OF TOTAL THYROXINE: CPT

## 2024-01-05 RX ORDER — 0.9 % SODIUM CHLORIDE 0.9 %
500 INTRAVENOUS SOLUTION INTRAVENOUS ONCE
Status: COMPLETED | OUTPATIENT
Start: 2024-01-05 | End: 2024-01-05

## 2024-01-05 RX ADMIN — SODIUM CHLORIDE 500 ML: 9 INJECTION, SOLUTION INTRAVENOUS at 19:48

## 2024-01-05 ASSESSMENT — LIFESTYLE VARIABLES
HOW MANY STANDARD DRINKS CONTAINING ALCOHOL DO YOU HAVE ON A TYPICAL DAY: PATIENT DOES NOT DRINK
HOW OFTEN DO YOU HAVE A DRINK CONTAINING ALCOHOL: NEVER

## 2024-01-05 NOTE — ED TRIAGE NOTES
FIRST PROVIDER CONTACT ASSESSMENT NOTE       Department of Emergency Medicine                 First Provider Note            24  4:10 PM EST    Date of Encounter: No admission date for patient encounter.    Patient Name: Ezio Martinez  : 2000  MRN: [id number]    Chief Complaint: Loss of Consciousness (Pt was at work- kneeled down and when he stood up he got dizzy and then passed out. Co-workers had to \"slap him awake\". )      History of Present Illness:   Ezio Martinez is a 23 y.o. male who presents to the ED for LOC.   Passed out at work after standing.   Has had cough, congestion and wheezing.   Palpitations     Focused Physical Exam:  VS:    ED Triage Vitals   BP Temp Temp src Pulse Resp SpO2 Height Weight   -- -- -- -- -- -- -- --        Physical Ex: Constitutional: Alert and non-toxic.    Medical History:  has a past medical history of ADHD (attention deficit hyperactivity disorder), GERD (gastroesophageal reflux disease), Mononucleosis, Right hip tendonitis, Right knee pain, Seasonal allergies, and Viral illness.  Surgical History:  has no past surgical history on file.  Social History:  reports that he has never smoked. His smokeless tobacco use includes chew. He reports that he does not use drugs.  Family History: family history includes Allergies in his mother; Asthma in his mother; High Blood Pressure in his father.    Allergies: Patient has no known allergies.     Initial Plan of Care: Initiate Treatment-Testing, Proceed toTreatment Area When Bed Available for ED Attending/MLP to Continue Care      ---END OF FIRST PROVIDER CONTACT ASSESSMENT NOTE---  Electronically signed by Lourdes Willard PA-C   DD: 24

## 2024-01-06 NOTE — ED PROVIDER NOTES
1.005 - 1.030    Urine Hgb NEGATIVE NEGATIVE    pH, UA 6.5 5.0 - 9.0    Protein, UA NEGATIVE NEGATIVE mg/dL    Urobilinogen, Urine 0.2 0.0 - 1.0 EU/dL    Nitrite, Urine NEGATIVE NEGATIVE    Leukocyte Esterase, Urine NEGATIVE NEGATIVE    WBC, UA 0 TO 5 0 TO 5 /HPF    RBC, UA 0 TO 2 0 TO 2 /HPF   URINE DRUG SCREEN   Result Value Ref Range    Amphetamine Screen, Ur NEGATIVE NEGATIVE    Barbiturate Screen, Ur NEGATIVE NEGATIVE    Benzodiazepine Screen, Urine NEGATIVE NEGATIVE    Cocaine Metabolite, Urine POSITIVE (A) NEGATIVE    Methadone Screen, Urine NEGATIVE NEGATIVE    Opiates, Urine NEGATIVE NEGATIVE    Phencyclidine, Urine NEGATIVE NEGATIVE    Cannabinoid Scrn, Ur POSITIVE (A) NEGATIVE    Oxycodone Screen, Ur NEGATIVE NEGATIVE    Fentanyl, Ur NEGATIVE NEGATIVE    Buprenorphine Urine NEGATIVE NEGATIVE    Test Information       These drug screen results are for medical purposes only and should not be considered definitive or confirmed.   EKG 12 Lead   Result Value Ref Range    Ventricular Rate 56 BPM    Atrial Rate 56 BPM    P-R Interval 144 ms    QRS Duration 112 ms    Q-T Interval 424 ms    QTc Calculation (Bazett) 409 ms    P Axis 34 degrees    R Axis 92 degrees    T Axis 62 degrees     Imaging:  All Radiology results interpreted by Radiologist unless otherwise noted.  XR CHEST (2 VW)   Final Result   No acute process.           EKG #1:  Interpreted by emergency department attending physician unless otherwise noted.    1/6/24  Time: 1956    Rhythm: normal sinus  and sinus bradycardia  Rate: 56  Axis: right  Conduction: normal  ST Segments: normal  T Waves: normal    Clinical Impression: NSR, Normal ecg, sinus rhythm with, sinus bradycardia  Comparison to Prior tracings:  There are no significant changes when compared to prior tracings.    ED Course / Medical Decision Making     Medications   sodium chloride 0.9 % bolus 500 mL (0 mLs IntraVENous Stopped 1/5/24 2049)        Re-Evaluations:  1/6/24      Time: pt 
patient

## 2024-01-08 LAB
EKG ATRIAL RATE: 56 BPM
EKG P AXIS: 34 DEGREES
EKG P-R INTERVAL: 144 MS
EKG Q-T INTERVAL: 424 MS
EKG QRS DURATION: 112 MS
EKG QTC CALCULATION (BAZETT): 409 MS
EKG R AXIS: 92 DEGREES
EKG T AXIS: 62 DEGREES
EKG VENTRICULAR RATE: 56 BPM

## 2024-02-19 ENCOUNTER — OFFICE VISIT (OUTPATIENT)
Dept: FAMILY MEDICINE CLINIC | Age: 24
End: 2024-02-19
Payer: COMMERCIAL

## 2024-02-19 VITALS
SYSTOLIC BLOOD PRESSURE: 122 MMHG | OXYGEN SATURATION: 98 % | HEART RATE: 76 BPM | BODY MASS INDEX: 27.39 KG/M2 | WEIGHT: 225 LBS | RESPIRATION RATE: 18 BRPM | DIASTOLIC BLOOD PRESSURE: 80 MMHG | TEMPERATURE: 97.6 F

## 2024-02-19 DIAGNOSIS — J32.9 SINOBRONCHITIS: Primary | ICD-10-CM

## 2024-02-19 DIAGNOSIS — J40 SINOBRONCHITIS: Primary | ICD-10-CM

## 2024-02-19 PROCEDURE — 99213 OFFICE O/P EST LOW 20 MIN: CPT | Performed by: NURSE PRACTITIONER

## 2024-02-19 RX ORDER — HYDROXYZINE HYDROCHLORIDE 25 MG/1
TABLET, FILM COATED ORAL
COMMUNITY
Start: 2024-01-29

## 2024-02-19 RX ORDER — METHYLPREDNISOLONE 4 MG/1
TABLET ORAL
Qty: 1 KIT | Refills: 0 | Status: SHIPPED | OUTPATIENT
Start: 2024-02-19

## 2024-02-19 RX ORDER — CEFDINIR 300 MG/1
300 CAPSULE ORAL 2 TIMES DAILY
Qty: 14 CAPSULE | Refills: 0 | Status: SHIPPED | OUTPATIENT
Start: 2024-02-19 | End: 2024-02-26

## 2024-02-19 RX ORDER — BENZONATATE 100 MG/1
100 CAPSULE ORAL 3 TIMES DAILY PRN
Qty: 30 CAPSULE | Refills: 0 | Status: SHIPPED | OUTPATIENT
Start: 2024-02-19 | End: 2024-02-29

## 2024-02-19 RX ORDER — ESCITALOPRAM OXALATE 10 MG/1
10 TABLET ORAL DAILY
COMMUNITY
Start: 2024-01-29

## 2024-02-19 NOTE — PROGRESS NOTES
for Cough, Disp-30 capsule, R-0Normal       Increase fluids and rest.   Other symptomatic relief discussed including Tylenol prn pain/fever. Schedule f/u with PCP in 7-10 days if symptoms persist.  Go to ED sooner if symptoms worsen or change. ED immediately with high or refractory fever, progressive SOB, dyspnea, CP, calf pain/swelling, shaking chills, vomiting, abdominal pain, lethargy, flank pain, or decreased urinary output. Pt verbalizes understanding and is in agreement with plan of care. All questions answered.    DI Richey - PATRICIA    *NOTE: This report was transcribed using voice recognition software. Every effort was made to ensure accuracy; however, inadvertent computerized transcription errors may be present.